# Patient Record
Sex: FEMALE | Race: WHITE | Employment: OTHER | URBAN - METROPOLITAN AREA
[De-identification: names, ages, dates, MRNs, and addresses within clinical notes are randomized per-mention and may not be internally consistent; named-entity substitution may affect disease eponyms.]

---

## 2021-06-12 ENCOUNTER — HOSPITAL ENCOUNTER (INPATIENT)
Age: 71
LOS: 3 days | Discharge: HOME | DRG: 191 | End: 2021-06-15
Attending: EMERGENCY MEDICINE | Admitting: FAMILY MEDICINE
Payer: MEDICARE

## 2021-06-12 ENCOUNTER — APPOINTMENT (OUTPATIENT)
Dept: GENERAL RADIOLOGY | Age: 71
DRG: 191 | End: 2021-06-12
Payer: MEDICARE

## 2021-06-12 DIAGNOSIS — J44.1 COPD EXACERBATION (HCC): Primary | ICD-10-CM

## 2021-06-12 DIAGNOSIS — R06.09 DOE (DYSPNEA ON EXERTION): ICD-10-CM

## 2021-06-12 LAB
ABSOLUTE EOS #: 0.6 K/UL (ref 0–0.4)
ABSOLUTE IMMATURE GRANULOCYTE: ABNORMAL K/UL (ref 0–0.3)
ABSOLUTE LYMPH #: 1.1 K/UL (ref 1–4.8)
ABSOLUTE MONO #: 0.7 K/UL (ref 0.1–1.2)
ANION GAP SERPL CALCULATED.3IONS-SCNC: 11 MMOL/L (ref 9–17)
BASOPHILS # BLD: 1 % (ref 0–2)
BASOPHILS ABSOLUTE: 0.1 K/UL (ref 0–0.2)
BNP INTERPRETATION: ABNORMAL
BUN BLDV-MCNC: 20 MG/DL (ref 8–23)
BUN/CREAT BLD: ABNORMAL (ref 9–20)
CALCIUM SERPL-MCNC: 9.7 MG/DL (ref 8.6–10.4)
CHLORIDE BLD-SCNC: 105 MMOL/L (ref 98–107)
CO2: 23 MMOL/L (ref 20–31)
CREAT SERPL-MCNC: 0.79 MG/DL (ref 0.5–0.9)
D-DIMER QUANTITATIVE: 0.32 MG/L FEU
DIFFERENTIAL TYPE: ABNORMAL
EOSINOPHILS RELATIVE PERCENT: 7 % (ref 1–4)
GFR AFRICAN AMERICAN: >60 ML/MIN
GFR NON-AFRICAN AMERICAN: >60 ML/MIN
GFR SERPL CREATININE-BSD FRML MDRD: ABNORMAL ML/MIN/{1.73_M2}
GFR SERPL CREATININE-BSD FRML MDRD: ABNORMAL ML/MIN/{1.73_M2}
GLUCOSE BLD-MCNC: 143 MG/DL (ref 70–99)
HCT VFR BLD CALC: 40.8 % (ref 36–46)
HEMOGLOBIN: 13.6 G/DL (ref 12–16)
IMMATURE GRANULOCYTES: ABNORMAL %
LYMPHOCYTES # BLD: 13 % (ref 24–44)
MCH RBC QN AUTO: 33 PG (ref 26–34)
MCHC RBC AUTO-ENTMCNC: 33.3 G/DL (ref 31–37)
MCV RBC AUTO: 98.9 FL (ref 80–100)
MONOCYTES # BLD: 9 % (ref 2–11)
NRBC AUTOMATED: ABNORMAL PER 100 WBC
PDW BLD-RTO: 13.8 % (ref 12.5–15.4)
PLATELET # BLD: 374 K/UL (ref 140–450)
PLATELET ESTIMATE: ABNORMAL
PMV BLD AUTO: 7.6 FL (ref 6–12)
POTASSIUM SERPL-SCNC: 3.8 MMOL/L (ref 3.7–5.3)
PRO-BNP: 390 PG/ML
RBC # BLD: 4.13 M/UL (ref 4–5.2)
RBC # BLD: ABNORMAL 10*6/UL
SARS-COV-2, RAPID: NOT DETECTED
SEG NEUTROPHILS: 70 % (ref 36–66)
SEGMENTED NEUTROPHILS ABSOLUTE COUNT: 5.9 K/UL (ref 1.8–7.7)
SODIUM BLD-SCNC: 139 MMOL/L (ref 135–144)
SPECIMEN DESCRIPTION: NORMAL
TROPONIN INTERP: ABNORMAL
TROPONIN INTERP: ABNORMAL
TROPONIN T: ABNORMAL NG/ML
TROPONIN T: ABNORMAL NG/ML
TROPONIN, HIGH SENSITIVITY: 20 NG/L (ref 0–14)
TROPONIN, HIGH SENSITIVITY: 22 NG/L (ref 0–14)
WBC # BLD: 8.4 K/UL (ref 3.5–11)
WBC # BLD: ABNORMAL 10*3/UL

## 2021-06-12 PROCEDURE — 84484 ASSAY OF TROPONIN QUANT: CPT

## 2021-06-12 PROCEDURE — 96374 THER/PROPH/DIAG INJ IV PUSH: CPT

## 2021-06-12 PROCEDURE — 71046 X-RAY EXAM CHEST 2 VIEWS: CPT

## 2021-06-12 PROCEDURE — 80048 BASIC METABOLIC PNL TOTAL CA: CPT

## 2021-06-12 PROCEDURE — 6370000000 HC RX 637 (ALT 250 FOR IP): Performed by: NURSE PRACTITIONER

## 2021-06-12 PROCEDURE — 93005 ELECTROCARDIOGRAM TRACING: CPT | Performed by: PHYSICIAN ASSISTANT

## 2021-06-12 PROCEDURE — 99285 EMERGENCY DEPT VISIT HI MDM: CPT

## 2021-06-12 PROCEDURE — 85025 COMPLETE CBC W/AUTO DIFF WBC: CPT

## 2021-06-12 PROCEDURE — 83874 ASSAY OF MYOGLOBIN: CPT

## 2021-06-12 PROCEDURE — 87635 SARS-COV-2 COVID-19 AMP PRB: CPT

## 2021-06-12 PROCEDURE — 1210000000 HC MED SURG R&B

## 2021-06-12 PROCEDURE — 85379 FIBRIN DEGRADATION QUANT: CPT

## 2021-06-12 PROCEDURE — 36415 COLL VENOUS BLD VENIPUNCTURE: CPT

## 2021-06-12 PROCEDURE — 83880 ASSAY OF NATRIURETIC PEPTIDE: CPT

## 2021-06-12 PROCEDURE — 94640 AIRWAY INHALATION TREATMENT: CPT

## 2021-06-12 PROCEDURE — 6370000000 HC RX 637 (ALT 250 FOR IP): Performed by: PHYSICIAN ASSISTANT

## 2021-06-12 PROCEDURE — 2580000003 HC RX 258: Performed by: NURSE PRACTITIONER

## 2021-06-12 PROCEDURE — 6360000002 HC RX W HCPCS: Performed by: PHYSICIAN ASSISTANT

## 2021-06-12 RX ORDER — BUDESONIDE AND FORMOTEROL FUMARATE DIHYDRATE 160; 4.5 UG/1; UG/1
2 AEROSOL RESPIRATORY (INHALATION) 2 TIMES DAILY
COMMUNITY

## 2021-06-12 RX ORDER — ALBUTEROL SULFATE 2.5 MG/3ML
2.5 SOLUTION RESPIRATORY (INHALATION)
Status: DISCONTINUED | OUTPATIENT
Start: 2021-06-12 | End: 2021-06-15 | Stop reason: HOSPADM

## 2021-06-12 RX ORDER — IPRATROPIUM BROMIDE AND ALBUTEROL SULFATE 2.5; .5 MG/3ML; MG/3ML
1 SOLUTION RESPIRATORY (INHALATION)
Status: DISCONTINUED | OUTPATIENT
Start: 2021-06-13 | End: 2021-06-15 | Stop reason: HOSPADM

## 2021-06-12 RX ORDER — SODIUM CHLORIDE 0.9 % (FLUSH) 0.9 %
5-40 SYRINGE (ML) INJECTION PRN
Status: DISCONTINUED | OUTPATIENT
Start: 2021-06-12 | End: 2021-06-15 | Stop reason: HOSPADM

## 2021-06-12 RX ORDER — ACETAMINOPHEN 325 MG/1
650 TABLET ORAL EVERY 6 HOURS PRN
Status: DISCONTINUED | OUTPATIENT
Start: 2021-06-12 | End: 2021-06-15 | Stop reason: HOSPADM

## 2021-06-12 RX ORDER — METHYLPREDNISOLONE SODIUM SUCCINATE 125 MG/2ML
125 INJECTION, POWDER, LYOPHILIZED, FOR SOLUTION INTRAMUSCULAR; INTRAVENOUS ONCE
Status: COMPLETED | OUTPATIENT
Start: 2021-06-12 | End: 2021-06-12

## 2021-06-12 RX ORDER — ONDANSETRON 2 MG/ML
4 INJECTION INTRAMUSCULAR; INTRAVENOUS EVERY 6 HOURS PRN
Status: DISCONTINUED | OUTPATIENT
Start: 2021-06-12 | End: 2021-06-15 | Stop reason: HOSPADM

## 2021-06-12 RX ORDER — ONDANSETRON 4 MG/1
4 TABLET, ORALLY DISINTEGRATING ORAL EVERY 8 HOURS PRN
Status: DISCONTINUED | OUTPATIENT
Start: 2021-06-12 | End: 2021-06-15 | Stop reason: HOSPADM

## 2021-06-12 RX ORDER — IPRATROPIUM BROMIDE AND ALBUTEROL SULFATE 2.5; .5 MG/3ML; MG/3ML
1 SOLUTION RESPIRATORY (INHALATION)
Status: DISCONTINUED | OUTPATIENT
Start: 2021-06-12 | End: 2021-06-13

## 2021-06-12 RX ORDER — SODIUM CHLORIDE 9 MG/ML
INJECTION, SOLUTION INTRAVENOUS CONTINUOUS
Status: DISCONTINUED | OUTPATIENT
Start: 2021-06-12 | End: 2021-06-14

## 2021-06-12 RX ORDER — METHYLPREDNISOLONE SODIUM SUCCINATE 40 MG/ML
40 INJECTION, POWDER, LYOPHILIZED, FOR SOLUTION INTRAMUSCULAR; INTRAVENOUS EVERY 6 HOURS
Status: DISPENSED | OUTPATIENT
Start: 2021-06-12 | End: 2021-06-14

## 2021-06-12 RX ORDER — POLYETHYLENE GLYCOL 3350 17 G/17G
17 POWDER, FOR SOLUTION ORAL DAILY PRN
Status: DISCONTINUED | OUTPATIENT
Start: 2021-06-12 | End: 2021-06-15 | Stop reason: HOSPADM

## 2021-06-12 RX ORDER — SODIUM CHLORIDE 0.9 % (FLUSH) 0.9 %
5-40 SYRINGE (ML) INJECTION EVERY 12 HOURS SCHEDULED
Status: DISCONTINUED | OUTPATIENT
Start: 2021-06-12 | End: 2021-06-15 | Stop reason: HOSPADM

## 2021-06-12 RX ORDER — SODIUM CHLORIDE 9 MG/ML
25 INJECTION, SOLUTION INTRAVENOUS PRN
Status: DISCONTINUED | OUTPATIENT
Start: 2021-06-12 | End: 2021-06-15 | Stop reason: HOSPADM

## 2021-06-12 RX ORDER — ALBUTEROL SULFATE 90 UG/1
2 AEROSOL, METERED RESPIRATORY (INHALATION) EVERY 4 HOURS PRN
Status: ON HOLD | COMMUNITY
End: 2022-10-09 | Stop reason: SDUPTHER

## 2021-06-12 RX ORDER — LORAZEPAM 0.5 MG/1
0.5 TABLET ORAL ONCE
Status: COMPLETED | OUTPATIENT
Start: 2021-06-12 | End: 2021-06-12

## 2021-06-12 RX ORDER — PREDNISONE 20 MG/1
40 TABLET ORAL DAILY
Status: DISCONTINUED | OUTPATIENT
Start: 2021-06-15 | End: 2021-06-15 | Stop reason: HOSPADM

## 2021-06-12 RX ORDER — ACETAMINOPHEN 650 MG/1
650 SUPPOSITORY RECTAL EVERY 6 HOURS PRN
Status: DISCONTINUED | OUTPATIENT
Start: 2021-06-12 | End: 2021-06-15 | Stop reason: HOSPADM

## 2021-06-12 RX ORDER — BUDESONIDE AND FORMOTEROL FUMARATE DIHYDRATE 160; 4.5 UG/1; UG/1
2 AEROSOL RESPIRATORY (INHALATION) 2 TIMES DAILY
Status: DISCONTINUED | OUTPATIENT
Start: 2021-06-12 | End: 2021-06-15 | Stop reason: HOSPADM

## 2021-06-12 RX ADMIN — IPRATROPIUM BROMIDE AND ALBUTEROL SULFATE 1 AMPULE: .5; 3 SOLUTION RESPIRATORY (INHALATION) at 19:34

## 2021-06-12 RX ADMIN — BUDESONIDE AND FORMOTEROL FUMARATE DIHYDRATE 2 PUFF: 160; 4.5 AEROSOL RESPIRATORY (INHALATION) at 21:46

## 2021-06-12 RX ADMIN — METHYLPREDNISOLONE SODIUM SUCCINATE 125 MG: 125 INJECTION, POWDER, FOR SOLUTION INTRAMUSCULAR; INTRAVENOUS at 18:50

## 2021-06-12 RX ADMIN — SODIUM CHLORIDE: 9 INJECTION, SOLUTION INTRAVENOUS at 21:04

## 2021-06-12 RX ADMIN — LORAZEPAM 0.5 MG: 0.5 TABLET ORAL at 23:00

## 2021-06-12 RX ADMIN — SODIUM CHLORIDE, PRESERVATIVE FREE 10 ML: 5 INJECTION INTRAVENOUS at 21:06

## 2021-06-12 ASSESSMENT — PAIN SCALES - GENERAL
PAINLEVEL_OUTOF10: 1
PAINLEVEL_OUTOF10: 0

## 2021-06-12 NOTE — ED PROVIDER NOTES
[id number] [facility]  [address] [facility] [address]  Phone: [phone]  Fax: [phone] [facility]      Pt Name: Wiley Hawk  MRN: [id number]  [place] 1950  Date of evaluation: 6/12/21      CHIEF COMPLAINT:  Chief Complaint   Patient presents with    Shortness of Breath    Cough       HISTORY OF PRESENT ILLNESS    Wiley Hawk is a 69 y.o. female who presents with respiratory complaint:     Location/Symptom:      Cough? YES    Productive? No  Fever? No  SOB? Yes  Wheezing? Yes  Pleuritic pain? No  Chestpain associated? No  Hx asthma or COPD? Yes  Smoker? Quit, 26 pack year history  Trauma? NO    Timing/Onset: 3 weeks   Context/Setting: Patient here via EMS for significant coughing and shortness of breath has been going on for the last 2 to 3 weeks. Patient states that she has a known history of asthma and COPD, she does have nebulizer treatment and MDI at home. Patient states that she sees Dr. David Hendrix as her pulmonologist in the area. Patient just came up here 14 days ago after living down with family in the [facility] for approximately 17 months. Patient states that she had a significant episode like this March of this year where she was in the hospital for 5 or 6 days. Patient states she had some sort of lung infection at that time. Patient denies any present or preceding chest pain symptoms with today's complaints. Patient denies any present leg swelling or pain nor any after traveling back up here from [place]. Patient denies any thrombotic history. Patient takes no blood thinners. Patient did not receive Covid vaccination due to previously mentioned hospitalization for respiratory issues. Nursing Notes were reviewed. REVIEW OF SYSTEMS       Constitutional:  Per HPI  Eyes: No visual changes. Neck: No neck pain.    Respiratory:  Per HPI  Cardiac:  Per HPI   GI:  Denies abdominal pain/nausea/vomiting/diarrhea. : Denies dysuria. Musculoskeletal: Denies focal weakness. Neurologic: denies headache or focal weakness. Skin:  Denies any rash. Negative in 10 essential Systems except as mentioned above and in the HPI. PAST MEDICAL HISTORY   PMH:  has a past medical history of Asthma, COPD (chronic obstructive pulmonary disease) ([address] and Kidney disease. Surgical History:  has a past surgical history that includes Hysterectomy and Dental surgery. Social History:  reports that she quit smoking about 7 years ago. She has never used smokeless tobacco. She reports that she does not drink alcohol and does not use drugs. Family History: Noncontributory   Psychiatric History: Noncontributory     Allergies:is allergic to penicillins. PHYSICAL EXAM     INITIAL VITALS: BP (!) 173/96   Pulse 111   Temp 98.4 °F (36.9 °C) (Oral)   Resp 20   Ht 5' 5\" (1.651 m)   Wt 46.7 kg (103 lb)   SpO2 99%   BMI 17.14 kg/m²   Constitutional:  Thin build  Eyes:  Pupils equal/round  HENT:  Atraumatic, External ears normal, Nose normal, Post pharynx normal, no tonsillar edema/erythema. Oropharynx moist.   Respiratory:   Expiratory wheezing auscultated all fields. Patient is speaking fluently and vigorously though she has a nasal cannula with about 3 L flowing. Patient is in no respiratory distress. Cardiovascular:    Tachy, RR with normal S1 and S2  Gastrointestinal/Abdomen:  Soft, NT.  BS present. Musculoskeletal:  Normal to inspection  Back:  No CVA tenderness. Integument:  No rash. Neurologic:  Alert, age appropriate interaction/mentation, no focal deficits noted       DIAGNOSTIC RESULTS     EKG: All EKG's are interpreted by the Emergency Department Physician who either signs or Co-signs this chart in the absence of a cardiologist.  Not indicated, or per attending note    RADIOLOGY:   Reviewed the radiologist:  XR CHEST (2 VW)   Final Result   Hyperinflated, clear lungs. LABS:  Labs Reviewed   TROPONIN - Abnormal; Notable for the following components:       Result Value    Troponin, High Sensitivity 22 (*)     All other components within normal limits   CBC WITH AUTO DIFFERENTIAL - Abnormal; Notable for the following components:    Seg Neutrophils 70 (*)     Lymphocytes 13 (*)     Eosinophils % 7 (*)     Absolute Eos # 0.60 (*)     All other components within normal limits   BASIC METABOLIC PANEL - Abnormal; Notable for the following components:    Glucose 143 (*)     All other components within normal limits   BRAIN NATRIURETIC PEPTIDE - Abnormal; Notable for the following components:    Pro- (*)     All other components within normal limits   D-DIMER, QUANTITATIVE   TROPONIN         EMERGENCY DEPARTMENT COURSE/MDM/DDX:     1743  Patient by history and clinically appears to be having a COPD exacerbation sounds as though has been going on for a couple of weeks. She is denying any present or preceding chest pain or palpitations. Complaining a cardiac work-up and adding a D-dimer with her recent travel. She is using a little bit of oxygen approximately 3 L keeping her at around 97-98% though she took this off during my exam and she was maintaining 97%. Patient declined nebulizer here she just received and although I will give her some Solu-Medrol IV.    1937  Will discuss admit with Charla for further nebs/steroids/monitoring with her REED. Prelim CXR looks neg for pneumonia/effusion/other acute process. Pt much better on 02 but was not hypoxic early. Trop slightly elevated so 2nd Trop pending. Pt agreeable to admit. [facility] agreeable to admit.      Orders Placed This Encounter   Medications    methylPREDNISolone sodium (SOLU-MEDROL) injection 125 mg    ipratropium-albuterol (DUONEB) nebulizer solution 1 ampule     Order Specific Question:   Initiate RT Bronchodilator Protocol     Answer:   Yes       CONSULTS:  None      FINAL IMPRESSION      1. COPD exacerbation ([facility]    2. REED (dyspnea on exertion)          DISPOSITION/PLAN:  DISPOSITION          PATIENT REFERRED TO:  No follow-up provider specified.     DISCHARGE MEDICATIONS:  New Prescriptions    No medications on file       (Please note that portions of this note were completed with a voice recognition program.  Efforts were made to edit the dictations but occasionally words are mis-transcribed.)    JOSE Coulter PA-C  06/12/21 1956

## 2021-06-12 NOTE — ED PROVIDER NOTES
[address] [facility] Surg ICU  [address] [facility] [address]  Phone: [phone]      Attending Physician Attestation    I performed a history and physical examination of the patient and discussed management with the mid level provider. I reviewed the mid level provider's note and agree with the documented findings and plan of care. Any areas of disagreement are noted on the chart. I was personally present for the key portions of any procedures. I have documented in the chart those procedures where I was not present during the key portions. I have reviewed the emergency nurses triage note. I agree with the chief complaint, past medical history, past surgical history, allergies, medications, social and family history as documented unless otherwise noted below. Documentation of the HPI, Physical Exam and Medical Decision Making performed by mid level providers is based on my personal performance of the HPI, PE and MDM. For Physician Assistant/ Nurse Practitioner cases/documentation I have personally evaluated this patient and have completed at least one if not all key elements of the E/M (history, physical exam, and MDM). Additional findings are as noted. CHIEF COMPLAINT       Chief Complaint   Patient presents with    Shortness of Breath    Cough         HISTORY OF PRESENT ILLNESS    Shana Ray is a 69 y.o. female who presents with cough and dyspnea. History of COPD. Congestion. EMS brought patient to ED had aerosal.        PAST MEDICAL HISTORY    has a past medical history of Asthma, COPD (chronic obstructive pulmonary disease) ([address] Fibromyalgia, Herniated disc, cervical, Herniated lumbar intervertebral disc, and Kidney disease. SURGICAL HISTORY      has a past surgical history that includes Hysterectomy and Dental surgery.     CURRENT MEDICATIONS       Current Discharge Medication List      CONTINUE these medications which have NOT CHANGED    Details budesonide-formoterol (SYMBICORT) 160-4.5 MCG/ACT AERO Inhale 2 puffs into the lungs 2 times daily      albuterol sulfate HFA (VENTOLIN HFA) 108 (90 Base) MCG/ACT inhaler Inhale 2 puffs into the lungs every 4 hours as needed for Wheezing       albuterol (PROVENTIL) (5 MG/ML) 0.5% nebulizer solution Take 2.5 mg by nebulization every 4 hours as needed for Wheezing             ALLERGIES     is allergic to penicillins. FAMILY HISTORY     She indicated that the status of her mother is unknown. She indicated that the status of her father is unknown.     family history includes Diabetes in her mother; Heart Disease in her father. SOCIAL HISTORY      reports that she quit smoking about 7 years ago. She has never used smokeless tobacco. She reports current alcohol use of about 3.0 standard drinks of alcohol per week. She reports that she does not use drugs. PHYSICAL EXAM     INITIAL VITALS:  height is 5' 5\" (1.651 m) and weight is 49 kg (108 lb 0.4 oz). Her oral temperature is 97.7 °F (36.5 °C). Her blood pressure is 126/68 and her pulse is 84. Her respiration is 20 and oxygen saturation is 96%.       The head is normocephalic   The neck is supple trachea midline no adenopathy no meningeal signs  Cardiac S1-S2 with a regular rate and rhythm  Pulmonary there is scattered wheezing throughout both lung fields with some mild respiratory distress  Abdomen is soft nontender nondistended with positive bowel sounds  Extremities are warm and dry with good pulses motor sensation throughout no calf swelling or tenderness appreciated  Skin is without rashes or lesions  Neurologic GCS is 15 and no focal deficits are appreciated      DIAGNOSTIC RESULTS     EKG: All EKG's are interpreted by the Emergency Department Physician who either signs or Co-signs this chart in the absence of a cardiologist.      Interpreted by Aleshia Garcia MD     Rhythm: Sinus tachycardia  Rate: 109  Axis: 21  Ectopy: none  Conduction: Poor R wave progression  ST Segments: no acute change  T Waves: no acute change  Q Waves: none    Clinical Impression: Sinus tachycardia with no acute changes/normal EKG. No acute infarction/ischemia noted. RADIOLOGY:   Non-plain film images such as CT, Ultrasound and MRI are read by the radiologist. Roanne Barthel radiographic images are visualized and the radiologist interpretations are reviewed as follows:     XR CHEST (2 VW) (Final result)  Result time 06/12/21 19:36:30  Final result by Fadi Otero MD (06/12/21 19:36:30)                Impression:    Hyperinflated, clear lungs. Narrative:    EXAMINATION:   TWO XRAY VIEWS OF THE CHEST     6/12/2021 5:39 pm     COMPARISON:   None. HISTORY:   ORDERING SYSTEM PROVIDED HISTORY: SOB/cough; COPD history   TECHNOLOGIST PROVIDED HISTORY:   SOB/cough; COPD history   Reason for Exam: Shortness of breath, cough   Acuity: Acute   Type of Exam: Initial     FINDINGS:   Upright frontal and lateral view chest radiographs were obtained. The heart size, mediastinal contour, and pleural spaces are within normal   limits. The lungs are hyperinflated but clear. There is no focal   consolidation or pneumothorax.  The pulmonary vascular pattern is within   normal limits.  No significant thoracic osseous abnormality. LABS:  Results for orders placed or performed during the hospital encounter of 06/12/21   COVID-19, Rapid    Specimen: Nasopharyngeal Swab   Result Value Ref Range    Specimen Description . NASOPHARYNGEAL SWAB     SARS-CoV-2, Rapid Not Detected Not Detected   Troponin   Result Value Ref Range    Troponin, High Sensitivity 22 (H) 0 - 14 ng/L    Troponin T NOT REPORTED <0.03 ng/mL    Troponin Interp NOT REPORTED    CBC Auto Differential   Result Value Ref Range    WBC 8.4 3.5 - 11.0 k/uL    RBC 4.13 4.0 - 5.2 m/uL    Hemoglobin 13.6 12.0 - 16.0 g/dL    Hematocrit 40.8 36 - 46 %    MCV 98.9 80 - 100 fL    MCH 33.0 26 - 34 pg    MCHC 33.3 31 - 37 g/dL RDW 13.8 12.5 - 15.4 %    Platelets 534 515 - 061 k/uL    MPV 7.6 6.0 - 12.0 fL    NRBC Automated NOT REPORTED per 100 WBC    Differential Type NOT REPORTED     Seg Neutrophils 70 (H) 36 - 66 %    Lymphocytes 13 (L) 24 - 44 %    Monocytes 9 2 - 11 %    Eosinophils % 7 (H) 1 - 4 %    Basophils 1 0 - 2 %    Immature Granulocytes NOT REPORTED 0 %    Segs Absolute 5.90 1.8 - 7.7 k/uL    Absolute Lymph # 1.10 1.0 - 4.8 k/uL    Absolute Mono # 0.70 0.1 - 1.2 k/uL    Absolute Eos # 0.60 (H) 0.0 - 0.4 k/uL    Basophils Absolute 0.10 0.0 - 0.2 k/uL    Absolute Immature Granulocyte NOT REPORTED 0.00 - 0.30 k/uL    WBC Morphology NOT REPORTED     RBC Morphology NOT REPORTED     Platelet Estimate NOT REPORTED    Basic Metabolic Panel   Result Value Ref Range    Glucose 143 (H) 70 - 99 mg/dL    BUN 20 8 - 23 mg/dL    CREATININE 0.79 0.50 - 0.90 mg/dL    Bun/Cre Ratio NOT REPORTED 9 - 20    Calcium 9.7 8.6 - 10.4 mg/dL    Sodium 139 135 - 144 mmol/L    Potassium 3.8 3.7 - 5.3 mmol/L    Chloride 105 98 - 107 mmol/L    CO2 23 20 - 31 mmol/L    Anion Gap 11 9 - 17 mmol/L    GFR Non-African American >60 >60 mL/min    GFR African American >60 >60 mL/min    GFR Comment          GFR Staging NOT REPORTED    D-Dimer, Quantitative   Result Value Ref Range    D-Dimer, Quant 0.32 mg/L FEU   Brain Natriuretic Peptide   Result Value Ref Range    Pro- (H) <300 pg/mL    BNP Interpretation Pro-BNP Reference Range:    Troponin   Result Value Ref Range    Troponin, High Sensitivity 20 (H) 0 - 14 ng/L    Troponin T NOT REPORTED <0.03 ng/mL    Troponin Interp NOT REPORTED    Basic Metabolic Panel w/ Reflex to MG   Result Value Ref Range    Glucose 191 (H) 70 - 99 mg/dL    BUN 25 (H) 8 - 23 mg/dL    CREATININE 0.80 0.50 - 0.90 mg/dL    Bun/Cre Ratio NOT REPORTED 9 - 20    Calcium 9.0 8.6 - 10.4 mg/dL    Sodium 137 135 - 144 mmol/L    Potassium 4.5 3.7 - 5.3 mmol/L    Chloride 107 98 - 107 mmol/L    CO2 23 20 - 31 mmol/L    Anion Gap 7 (L) 9

## 2021-06-13 ENCOUNTER — APPOINTMENT (OUTPATIENT)
Dept: GENERAL RADIOLOGY | Age: 71
DRG: 191 | End: 2021-06-13
Payer: MEDICARE

## 2021-06-13 PROBLEM — N18.30 STAGE 3 CHRONIC KIDNEY DISEASE (HCC): Status: ACTIVE | Noted: 2021-06-13

## 2021-06-13 PROBLEM — I10 ESSENTIAL HYPERTENSION: Status: ACTIVE | Noted: 2021-06-13

## 2021-06-13 PROBLEM — R06.02 SHORTNESS OF BREATH: Status: ACTIVE | Noted: 2021-06-13

## 2021-06-13 PROBLEM — J44.9 CHRONIC OBSTRUCTIVE PULMONARY DISEASE (HCC): Status: ACTIVE | Noted: 2021-06-12

## 2021-06-13 PROBLEM — R06.09 DOE (DYSPNEA ON EXERTION): Status: ACTIVE | Noted: 2021-06-13

## 2021-06-13 LAB
ANION GAP SERPL CALCULATED.3IONS-SCNC: 7 MMOL/L (ref 9–17)
BUN BLDV-MCNC: 25 MG/DL (ref 8–23)
BUN/CREAT BLD: ABNORMAL (ref 9–20)
CALCIUM SERPL-MCNC: 9 MG/DL (ref 8.6–10.4)
CHLORIDE BLD-SCNC: 107 MMOL/L (ref 98–107)
CO2: 23 MMOL/L (ref 20–31)
CREAT SERPL-MCNC: 0.8 MG/DL (ref 0.5–0.9)
GFR AFRICAN AMERICAN: >60 ML/MIN
GFR NON-AFRICAN AMERICAN: >60 ML/MIN
GFR SERPL CREATININE-BSD FRML MDRD: ABNORMAL ML/MIN/{1.73_M2}
GFR SERPL CREATININE-BSD FRML MDRD: ABNORMAL ML/MIN/{1.73_M2}
GLUCOSE BLD-MCNC: 144 MG/DL (ref 65–105)
GLUCOSE BLD-MCNC: 145 MG/DL (ref 65–105)
GLUCOSE BLD-MCNC: 191 MG/DL (ref 70–99)
HCT VFR BLD CALC: 38.5 % (ref 36–46)
HEMOGLOBIN: 12.7 G/DL (ref 12–16)
MCH RBC QN AUTO: 32.8 PG (ref 26–34)
MCHC RBC AUTO-ENTMCNC: 33.1 G/DL (ref 31–37)
MCV RBC AUTO: 99.2 FL (ref 80–100)
MYOGLOBIN: 59 NG/ML (ref 25–58)
MYOGLOBIN: 62 NG/ML (ref 25–58)
NRBC AUTOMATED: ABNORMAL PER 100 WBC
PDW BLD-RTO: 14 % (ref 12.5–15.4)
PLATELET # BLD: 326 K/UL (ref 140–450)
PMV BLD AUTO: 7.4 FL (ref 6–12)
POTASSIUM SERPL-SCNC: 4.5 MMOL/L (ref 3.7–5.3)
RBC # BLD: 3.88 M/UL (ref 4–5.2)
SODIUM BLD-SCNC: 137 MMOL/L (ref 135–144)
TROPONIN INTERP: ABNORMAL
TROPONIN INTERP: ABNORMAL
TROPONIN T: ABNORMAL NG/ML
TROPONIN T: ABNORMAL NG/ML
TROPONIN, HIGH SENSITIVITY: 13 NG/L (ref 0–14)
TROPONIN, HIGH SENSITIVITY: 14 NG/L (ref 0–14)
WBC # BLD: 6.5 K/UL (ref 3.5–11)

## 2021-06-13 PROCEDURE — 6370000000 HC RX 637 (ALT 250 FOR IP): Performed by: PHYSICIAN ASSISTANT

## 2021-06-13 PROCEDURE — 82947 ASSAY GLUCOSE BLOOD QUANT: CPT

## 2021-06-13 PROCEDURE — 6370000000 HC RX 637 (ALT 250 FOR IP): Performed by: NURSE PRACTITIONER

## 2021-06-13 PROCEDURE — 2700000000 HC OXYGEN THERAPY PER DAY

## 2021-06-13 PROCEDURE — 85027 COMPLETE CBC AUTOMATED: CPT

## 2021-06-13 PROCEDURE — 99223 1ST HOSP IP/OBS HIGH 75: CPT | Performed by: FAMILY MEDICINE

## 2021-06-13 PROCEDURE — 94640 AIRWAY INHALATION TREATMENT: CPT

## 2021-06-13 PROCEDURE — 6370000000 HC RX 637 (ALT 250 FOR IP): Performed by: FAMILY MEDICINE

## 2021-06-13 PROCEDURE — 83874 ASSAY OF MYOGLOBIN: CPT

## 2021-06-13 PROCEDURE — 6360000002 HC RX W HCPCS: Performed by: NURSE PRACTITIONER

## 2021-06-13 PROCEDURE — 80048 BASIC METABOLIC PNL TOTAL CA: CPT

## 2021-06-13 PROCEDURE — 1210000000 HC MED SURG R&B

## 2021-06-13 PROCEDURE — APPSS45 APP SPLIT SHARED TIME 31-45 MINUTES: Performed by: NURSE PRACTITIONER

## 2021-06-13 PROCEDURE — 84484 ASSAY OF TROPONIN QUANT: CPT

## 2021-06-13 PROCEDURE — 71045 X-RAY EXAM CHEST 1 VIEW: CPT

## 2021-06-13 PROCEDURE — 2580000003 HC RX 258: Performed by: NURSE PRACTITIONER

## 2021-06-13 PROCEDURE — 36415 COLL VENOUS BLD VENIPUNCTURE: CPT

## 2021-06-13 RX ORDER — DEXTROSE MONOHYDRATE 50 MG/ML
100 INJECTION, SOLUTION INTRAVENOUS PRN
Status: DISCONTINUED | OUTPATIENT
Start: 2021-06-13 | End: 2021-06-15 | Stop reason: HOSPADM

## 2021-06-13 RX ORDER — CODEINE PHOSPHATE AND GUAIFENESIN 10; 100 MG/5ML; MG/5ML
10 SOLUTION ORAL EVERY 4 HOURS PRN
Status: DISCONTINUED | OUTPATIENT
Start: 2021-06-13 | End: 2021-06-13

## 2021-06-13 RX ORDER — LEVOFLOXACIN 500 MG/1
500 TABLET, FILM COATED ORAL DAILY
Status: DISCONTINUED | OUTPATIENT
Start: 2021-06-13 | End: 2021-06-15 | Stop reason: HOSPADM

## 2021-06-13 RX ORDER — FAMOTIDINE 20 MG/1
20 TABLET, FILM COATED ORAL 2 TIMES DAILY
Status: DISCONTINUED | OUTPATIENT
Start: 2021-06-13 | End: 2021-06-15 | Stop reason: HOSPADM

## 2021-06-13 RX ORDER — NICOTINE POLACRILEX 4 MG
15 LOZENGE BUCCAL PRN
Status: DISCONTINUED | OUTPATIENT
Start: 2021-06-13 | End: 2021-06-15 | Stop reason: HOSPADM

## 2021-06-13 RX ORDER — MONTELUKAST SODIUM 10 MG/1
10 TABLET ORAL NIGHTLY
Status: DISCONTINUED | OUTPATIENT
Start: 2021-06-13 | End: 2021-06-15 | Stop reason: HOSPADM

## 2021-06-13 RX ORDER — HYDROCODONE POLISTIREX AND CHLORPHENIRAMINE POLISTIREX 10; 8 MG/5ML; MG/5ML
5 SUSPENSION, EXTENDED RELEASE ORAL EVERY 12 HOURS PRN
Status: DISCONTINUED | OUTPATIENT
Start: 2021-06-13 | End: 2021-06-15 | Stop reason: HOSPADM

## 2021-06-13 RX ORDER — DEXTROSE MONOHYDRATE 25 G/50ML
12.5 INJECTION, SOLUTION INTRAVENOUS PRN
Status: DISCONTINUED | OUTPATIENT
Start: 2021-06-13 | End: 2021-06-15 | Stop reason: HOSPADM

## 2021-06-13 RX ADMIN — IPRATROPIUM BROMIDE AND ALBUTEROL SULFATE 1 AMPULE: .5; 2.5 SOLUTION RESPIRATORY (INHALATION) at 13:26

## 2021-06-13 RX ADMIN — BUDESONIDE AND FORMOTEROL FUMARATE DIHYDRATE 2 PUFF: 160; 4.5 AEROSOL RESPIRATORY (INHALATION) at 07:47

## 2021-06-13 RX ADMIN — FAMOTIDINE 20 MG: 20 TABLET ORAL at 20:56

## 2021-06-13 RX ADMIN — METHYLPREDNISOLONE SODIUM SUCCINATE 40 MG: 40 INJECTION, POWDER, FOR SOLUTION INTRAMUSCULAR; INTRAVENOUS at 09:42

## 2021-06-13 RX ADMIN — LEVOFLOXACIN 500 MG: 500 TABLET, FILM COATED ORAL at 09:44

## 2021-06-13 RX ADMIN — METHYLPREDNISOLONE SODIUM SUCCINATE 40 MG: 40 INJECTION, POWDER, FOR SOLUTION INTRAMUSCULAR; INTRAVENOUS at 03:43

## 2021-06-13 RX ADMIN — IPRATROPIUM BROMIDE AND ALBUTEROL SULFATE 1 AMPULE: .5; 2.5 SOLUTION RESPIRATORY (INHALATION) at 20:14

## 2021-06-13 RX ADMIN — METHYLPREDNISOLONE SODIUM SUCCINATE 40 MG: 40 INJECTION, POWDER, FOR SOLUTION INTRAMUSCULAR; INTRAVENOUS at 20:55

## 2021-06-13 RX ADMIN — IPRATROPIUM BROMIDE AND ALBUTEROL SULFATE 1 AMPULE: .5; 3 SOLUTION RESPIRATORY (INHALATION) at 16:49

## 2021-06-13 RX ADMIN — SODIUM CHLORIDE, PRESERVATIVE FREE 10 ML: 5 INJECTION INTRAVENOUS at 20:55

## 2021-06-13 RX ADMIN — INSULIN LISPRO 1 UNITS: 100 INJECTION, SOLUTION INTRAVENOUS; SUBCUTANEOUS at 17:18

## 2021-06-13 RX ADMIN — IPRATROPIUM BROMIDE AND ALBUTEROL SULFATE 1 AMPULE: .5; 3 SOLUTION RESPIRATORY (INHALATION) at 07:46

## 2021-06-13 RX ADMIN — SODIUM CHLORIDE: 9 INJECTION, SOLUTION INTRAVENOUS at 23:17

## 2021-06-13 RX ADMIN — MONTELUKAST SODIUM 10 MG: 10 TABLET, FILM COATED ORAL at 20:56

## 2021-06-13 RX ADMIN — METHYLPREDNISOLONE SODIUM SUCCINATE 40 MG: 40 INJECTION, POWDER, FOR SOLUTION INTRAMUSCULAR; INTRAVENOUS at 15:30

## 2021-06-13 RX ADMIN — BENZOCAINE AND MENTHOL 1 LOZENGE: 15; 3.6 LOZENGE ORAL at 20:55

## 2021-06-13 RX ADMIN — ENOXAPARIN SODIUM 40 MG: 40 INJECTION SUBCUTANEOUS at 09:42

## 2021-06-13 RX ADMIN — INSULIN LISPRO 1 UNITS: 100 INJECTION, SOLUTION INTRAVENOUS; SUBCUTANEOUS at 20:55

## 2021-06-13 RX ADMIN — BUDESONIDE AND FORMOTEROL FUMARATE DIHYDRATE 2 PUFF: 160; 4.5 AEROSOL RESPIRATORY (INHALATION) at 20:14

## 2021-06-13 RX ADMIN — Medication 5 ML: at 15:30

## 2021-06-13 ASSESSMENT — PAIN SCALES - GENERAL
PAINLEVEL_OUTOF10: 0

## 2021-06-13 ASSESSMENT — ENCOUNTER SYMPTOMS
CHOKING: 0
COUGH: 1
CHEST TIGHTNESS: 0
SHORTNESS OF BREATH: 1
WHEEZING: 1
EYES NEGATIVE: 1
GASTROINTESTINAL NEGATIVE: 1

## 2021-06-13 NOTE — PROGRESS NOTES
RT in for Aerosol administration . Pt awake Alert , watching Tv . 02 continues at 2lpm  , spot check reflecting a sat of 96% . Breath sounds with bilateral wheezing .  Harsh congested cough persist .

## 2021-06-13 NOTE — PLAN OF CARE
RT in to administer Aerosol therapy as well as MDI . Upon arrival to room pt was experiencing coughing spell . 02 noted at 2lpm producing a sat of 96% . Breath sounds reveal expiratory wheezes . Water provided to rinse mouth.

## 2021-06-13 NOTE — PLAN OF CARE
Problem: Falls - Risk of:  Goal: Will remain free from falls  Description: Will remain free from falls  6/13/2021 1504 by Hanna Guadalupe RN  Outcome: Ongoing  6/13/2021 0459 by Raheem Lim RN  Outcome: Ongoing  Goal: Absence of physical injury  Description: Absence of physical injury  6/13/2021 1504 by Hanna Guadalupe RN  Outcome: Ongoing  6/13/2021 0459 by Raheem Lim RN  Outcome: Ongoing     Problem: Breathing Pattern - Ineffective:  Goal: Ability to achieve and maintain a regular respiratory rate will improve  Description: Ability to achieve and maintain a regular respiratory rate will improve  6/13/2021 1504 by Hanna Guadalupe RN  Outcome: Ongoing  6/13/2021 0459 by Raheem Lim RN  Outcome: Ongoing     Problem: Respiratory:  Goal: Ability to maintain a clear airway will improve  Description: Ability to maintain a clear airway will improve  6/13/2021 1504 by Hanna Guadalupe RN  Outcome: Ongoing  6/13/2021 0819 by Aditi French RCP  Outcome: Ongoing  Goal: Ability to maintain adequate ventilation will improve  Description: Ability to maintain adequate ventilation will improve  6/13/2021 1504 by Hanna Guadalupe RN  Outcome: Ongoing  6/13/2021 0819 by Aditi French RCP  Outcome: Ongoing

## 2021-06-13 NOTE — H&P
[facility]  Office: [address], DO, Bernarda Banegas, DO, Katie Harkins, DO, Marlo Kanner Holland, DO, Matt Nowak MD, Lizzeth Putnam MD, Darby Olson MD, Martina Ulloa MD, Mariluz Lopez MD, Rochelle Castellanos MD, Vanesa Banks MD, Randall Miranda MD, Cheryl Mcneal DO, Michael Mtz MD, Yessenia Agrawal DO, Gustavo Bangura MD,  Shelia Garcia DO, Kay Lechuga MD, Nate Schmidt MD, Dayanara Mahoney MD, Tamra Cain MD, Boyd Friend CNP, [facility], CNP, Westley Hilliard, CNP, Olya Tena, CNS, Yolanda Fields, CNP, Kym Evans, CNP, Jessi Rosales, CNP, Sebastien Carlos, CNP, Hardy Badillo, CNP, Vero Bradley PA-C, Babatunde Jama, [facility], Manolo Xie, CNP, Toy Nieto, CNP, Veronika Snow, CNP, Cindy Mittal, CNP, Kiarra Holt, CNP, Pedro Dias, CNP         [facility]   [address] [facility]    HISTORY AND PHYSICAL EXAMINATION            Date:   6/13/2021  Patient name:  Paola Avila  Date of admission:  6/12/2021  5:05 PM  MRN:   [id number]  Account:  [de-identified]  YOB: 1950  PCP:    No primary care provider on file. Room:   [address] [place] [address]  Code Status:    Full Code    Chief Complaint:     Chief Complaint   Patient presents with    Shortness of Breath    Cough       History Obtained From:     patient    History of Present Illness:     Paola Avila is a 69 y.o. Non-/non  female who presents with Shortness of Breath and Cough   and is admitted to the hospital for the management of Chronic obstructive pulmonary disease with acute exacerbation ([facility] [address] Patient reports for the last 2 days she has been coughing and has not been able to catch her breath. She says she been using her nebulizer every few hours and was not really doing anything to help her. .  She called EMS after she was not able to stand well without being very short of breath. She says it she felt her heart racing with coughing and palpitations. Relation Age of Onset    Diabetes Mother     Heart Disease Father        Review of Systems:     Positive and Negative as described in HPI. Review of Systems   Constitutional: Negative. HENT: Negative. Eyes: Negative. Respiratory: Positive for cough, shortness of breath and wheezing. Negative for choking and chest tightness. Cardiovascular: Positive for palpitations. Negative for chest pain and leg swelling. Gastrointestinal: Negative. Genitourinary: Negative. Musculoskeletal: Negative. Skin: Negative. Neurological: Negative. Hematological: Negative. Psychiatric/Behavioral: Negative. Physical Exam:   BP (!) 151/73   Pulse 98   Temp 98.4 °F (36.9 °C) (Oral)   Resp 20   Ht 5' 5\" (1.651 m)   Wt 108 lb 0.4 oz (49 kg)   LMP  (LMP Unknown)   SpO2 95%   Breastfeeding No   BMI 17.98 kg/m²   Temp (24hrs), Av.1 °F (36.7 °C), Min:97.7 °F (36.5 °C), Max:98.4 °F (36.9 °C)        Intake/Output Summary (Last 24 hours) at 2021 1536  Last data filed at 2021 0715  Gross per 24 hour   Intake 240 ml   Output 750 ml   Net -510 ml       Physical Exam  Vitals and nursing note reviewed. Constitutional:       General: She is in acute distress. Appearance: She is ill-appearing. She is not toxic-appearing or diaphoretic. HENT:      Head: Normocephalic and atraumatic. Right Ear: External ear normal.      Left Ear: External ear normal.      Nose: Nose normal. No rhinorrhea. Mouth/Throat:      Mouth: Mucous membranes are moist.   Eyes:      General: No scleral icterus. Right eye: No discharge. Left eye: No discharge. Extraocular Movements: Extraocular movements intact. Conjunctiva/sclera: Conjunctivae normal.      Pupils: Pupils are equal, round, and reactive to light. Neck:      Comments: No JVD  Cardiovascular:      Rate and Rhythm: Normal rate and regular rhythm. Pulses: Normal pulses. Heart sounds: Normal heart sounds.  No murmur heard. No friction rub. No gallop. Pulmonary:      Effort: Respiratory distress present. Breath sounds: No stridor. Wheezing present. No rhonchi or rales. Chest:      Chest wall: No tenderness. Abdominal:      General: There is no distension. Palpations: Abdomen is soft. Tenderness: There is no abdominal tenderness. There is no guarding. Hernia: No hernia is present. Comments: Bowel sounds hypoactive   Musculoskeletal:         General: Normal range of motion. Cervical back: Normal range of motion and neck supple. Right lower leg: No edema. Left lower leg: No edema. Skin:     General: Skin is warm and dry. Coloration: Skin is not jaundiced. Findings: No bruising, erythema or lesion. Neurological:      General: No focal deficit present. Mental Status: She is alert and oriented to person, place, and time. Psychiatric:         Attention and Perception: Attention and perception normal.         Mood and Affect: Affect normal. Mood is anxious. Speech: Speech normal.         Behavior: Behavior normal. Behavior is cooperative. Thought Content:  Thought content normal.         Cognition and Memory: Cognition and memory normal.         Judgment: Judgment normal.         Investigations:      Laboratory Testing:  Recent Results (from the past 24 hour(s))   Troponin    Collection Time: 06/12/21  5:52 PM   Result Value Ref Range    Troponin, High Sensitivity 22 (H) 0 - 14 ng/L    Troponin T NOT REPORTED <0.03 ng/mL    Troponin Interp NOT REPORTED    CBC Auto Differential    Collection Time: 06/12/21  5:52 PM   Result Value Ref Range    WBC 8.4 3.5 - 11.0 k/uL    RBC 4.13 4.0 - 5.2 m/uL    Hemoglobin 13.6 12.0 - 16.0 g/dL    Hematocrit 40.8 36 - 46 %    MCV 98.9 80 - 100 fL    MCH 33.0 26 - 34 pg    MCHC 33.3 31 - 37 g/dL    RDW 13.8 12.5 - 15.4 %    Platelets 392 417 - 470 k/uL    MPV 7.6 6.0 - 12.0 fL    NRBC Automated NOT REPORTED per 100 acute exacerbation ([facility] 6/13/2021 Yes    Essential hypertension 6/13/2021 Yes    Shortness of breath 6/13/2021 Yes    Stage 3 chronic kidney disease ([facility] 6/13/2021 Yes          Plan:     Patient status inpatient in the Med/Surge    1. COPD/asthma exacerbation: Supplemental O2 as needed to keep SPO2 greater than 92%. IV Solu-Medrol 40 mg every 6 hours. Levaquin daily p.o. Continue Symbicort and Singulair. Nebulizer treatments as needed for wheezing or shortness of breath as ordered. Monitor blood glucose while on steroid. GI prophylaxis  2. Hypertension: Monitor vital signs every 8 hours. Vital signs reviewed. 3. Shortness of breath: See #1  4. Chronic kidney disease: Check BMP in AM.  Avoid nephrotoxic agents. Replace electrolytes as needed. Consultations:   [facility]     Patient is admitted as inpatient status because of co-morbidities listed above, severity of signs and symptoms as outlined, requirement for current medical therapies and most importantly because of direct risk to patient if care not provided in a hospital setting. Expected length of stay > 48 hours. DIONE Gaitan NP  6/13/2021  3:36 PM    Copy sent to Dr. Mayra Fontana primary care provider on file.

## 2021-06-14 LAB
ANION GAP SERPL CALCULATED.3IONS-SCNC: 10 MMOL/L (ref 9–17)
BUN BLDV-MCNC: 22 MG/DL (ref 8–23)
BUN/CREAT BLD: ABNORMAL (ref 9–20)
CALCIUM SERPL-MCNC: 9.2 MG/DL (ref 8.6–10.4)
CHLORIDE BLD-SCNC: 108 MMOL/L (ref 98–107)
CO2: 21 MMOL/L (ref 20–31)
CREAT SERPL-MCNC: 0.68 MG/DL (ref 0.5–0.9)
EKG ATRIAL RATE: 100 BPM
EKG P AXIS: 84 DEGREES
EKG P-R INTERVAL: 156 MS
EKG Q-T INTERVAL: 358 MS
EKG QRS DURATION: 74 MS
EKG QTC CALCULATION (BAZETT): 461 MS
EKG R AXIS: 31 DEGREES
EKG T AXIS: 73 DEGREES
EKG VENTRICULAR RATE: 100 BPM
GFR AFRICAN AMERICAN: >60 ML/MIN
GFR NON-AFRICAN AMERICAN: >60 ML/MIN
GFR SERPL CREATININE-BSD FRML MDRD: ABNORMAL ML/MIN/{1.73_M2}
GFR SERPL CREATININE-BSD FRML MDRD: ABNORMAL ML/MIN/{1.73_M2}
GLUCOSE BLD-MCNC: 125 MG/DL (ref 65–105)
GLUCOSE BLD-MCNC: 128 MG/DL (ref 65–105)
GLUCOSE BLD-MCNC: 128 MG/DL (ref 65–105)
GLUCOSE BLD-MCNC: 188 MG/DL (ref 70–99)
GLUCOSE BLD-MCNC: 194 MG/DL (ref 65–105)
POTASSIUM SERPL-SCNC: 4 MMOL/L (ref 3.7–5.3)
SODIUM BLD-SCNC: 139 MMOL/L (ref 135–144)

## 2021-06-14 PROCEDURE — 6370000000 HC RX 637 (ALT 250 FOR IP): Performed by: NURSE PRACTITIONER

## 2021-06-14 PROCEDURE — 97535 SELF CARE MNGMENT TRAINING: CPT

## 2021-06-14 PROCEDURE — 97116 GAIT TRAINING THERAPY: CPT

## 2021-06-14 PROCEDURE — 36415 COLL VENOUS BLD VENIPUNCTURE: CPT

## 2021-06-14 PROCEDURE — 6370000000 HC RX 637 (ALT 250 FOR IP): Performed by: FAMILY MEDICINE

## 2021-06-14 PROCEDURE — 6360000002 HC RX W HCPCS: Performed by: NURSE PRACTITIONER

## 2021-06-14 PROCEDURE — 1210000000 HC MED SURG R&B

## 2021-06-14 PROCEDURE — 80048 BASIC METABOLIC PNL TOTAL CA: CPT

## 2021-06-14 PROCEDURE — 99232 SBSQ HOSP IP/OBS MODERATE 35: CPT | Performed by: FAMILY MEDICINE

## 2021-06-14 PROCEDURE — 94640 AIRWAY INHALATION TREATMENT: CPT

## 2021-06-14 PROCEDURE — 97162 PT EVAL MOD COMPLEX 30 MIN: CPT

## 2021-06-14 PROCEDURE — 2580000003 HC RX 258: Performed by: NURSE PRACTITIONER

## 2021-06-14 PROCEDURE — 97166 OT EVAL MOD COMPLEX 45 MIN: CPT

## 2021-06-14 PROCEDURE — 82947 ASSAY GLUCOSE BLOOD QUANT: CPT

## 2021-06-14 RX ADMIN — BUDESONIDE AND FORMOTEROL FUMARATE DIHYDRATE 2 PUFF: 160; 4.5 AEROSOL RESPIRATORY (INHALATION) at 20:28

## 2021-06-14 RX ADMIN — FAMOTIDINE 20 MG: 20 TABLET ORAL at 09:18

## 2021-06-14 RX ADMIN — IPRATROPIUM BROMIDE AND ALBUTEROL SULFATE 1 AMPULE: .5; 2.5 SOLUTION RESPIRATORY (INHALATION) at 15:40

## 2021-06-14 RX ADMIN — IPRATROPIUM BROMIDE AND ALBUTEROL SULFATE 1 AMPULE: .5; 2.5 SOLUTION RESPIRATORY (INHALATION) at 07:41

## 2021-06-14 RX ADMIN — INSULIN LISPRO 1 UNITS: 100 INJECTION, SOLUTION INTRAVENOUS; SUBCUTANEOUS at 21:10

## 2021-06-14 RX ADMIN — BUDESONIDE AND FORMOTEROL FUMARATE DIHYDRATE 2 PUFF: 160; 4.5 AEROSOL RESPIRATORY (INHALATION) at 07:50

## 2021-06-14 RX ADMIN — IPRATROPIUM BROMIDE AND ALBUTEROL SULFATE 1 AMPULE: .5; 2.5 SOLUTION RESPIRATORY (INHALATION) at 20:11

## 2021-06-14 RX ADMIN — METHYLPREDNISOLONE SODIUM SUCCINATE 40 MG: 40 INJECTION, POWDER, FOR SOLUTION INTRAMUSCULAR; INTRAVENOUS at 03:19

## 2021-06-14 RX ADMIN — MONTELUKAST SODIUM 10 MG: 10 TABLET, FILM COATED ORAL at 21:10

## 2021-06-14 RX ADMIN — IPRATROPIUM BROMIDE AND ALBUTEROL SULFATE 1 AMPULE: .5; 2.5 SOLUTION RESPIRATORY (INHALATION) at 11:35

## 2021-06-14 RX ADMIN — ENOXAPARIN SODIUM 40 MG: 40 INJECTION SUBCUTANEOUS at 09:18

## 2021-06-14 RX ADMIN — METHYLPREDNISOLONE SODIUM SUCCINATE 40 MG: 40 INJECTION, POWDER, FOR SOLUTION INTRAMUSCULAR; INTRAVENOUS at 09:14

## 2021-06-14 RX ADMIN — LEVOFLOXACIN 500 MG: 500 TABLET, FILM COATED ORAL at 09:17

## 2021-06-14 RX ADMIN — SODIUM CHLORIDE, PRESERVATIVE FREE 10 ML: 5 INJECTION INTRAVENOUS at 16:37

## 2021-06-14 RX ADMIN — METHYLPREDNISOLONE SODIUM SUCCINATE 40 MG: 40 INJECTION, POWDER, FOR SOLUTION INTRAMUSCULAR; INTRAVENOUS at 16:37

## 2021-06-14 RX ADMIN — FAMOTIDINE 20 MG: 20 TABLET ORAL at 21:10

## 2021-06-14 RX ADMIN — SODIUM CHLORIDE, PRESERVATIVE FREE 10 ML: 5 INJECTION INTRAVENOUS at 09:14

## 2021-06-14 ASSESSMENT — ENCOUNTER SYMPTOMS
NAUSEA: 0
COUGH: 1
DIARRHEA: 0
SHORTNESS OF BREATH: 1
ABDOMINAL PAIN: 0
BLOOD IN STOOL: 0
CHEST TIGHTNESS: 0
VOMITING: 0
CONSTIPATION: 0
WHEEZING: 0

## 2021-06-14 ASSESSMENT — PAIN SCALES - GENERAL: PAINLEVEL_OUTOF10: 0

## 2021-06-14 NOTE — PROGRESS NOTES
Tolerated  Required Braces or Orthoses?: No  Vision/Hearing  Vision: Impaired  Vision Exceptions: Wears glasses for reading  Hearing: Within functional limits     Subjective  General  Chart Reviewed: No  Patient assessed for rehabilitation services?: Yes  Response To Previous Treatment: Not applicable  Family / Caregiver Present: No  Follows Commands: Within Functional Limits  Subjective  Subjective: Pt and RN agreeable to therapy. Pt denies pain. Pain Screening  Patient Currently in Pain: Denies  Vital Signs  Patient Currently in Pain: Denies       Orientation  Orientation  Overall Orientation Status: Within Functional Limits  Social/Functional History  Social/Functional History  Lives With: Alone  Type of Home: Apartment  Home Layout: One level  Home Access: Stairs to enter with rails (Apartment on second floor)  Entrance Stairs - Number of Steps: 12  Entrance Stairs - Rails: Right  Bathroom Shower/Tub: Tub/Shower unit  Bathroom Toilet: Standard  Bathroom Equipment:  (Pt states she has towel rack and window sill.)  Bathroom Accessibility: Not accessible  Receives Help From: Family  ADL Assistance: Independent  Homemaking Assistance: Independent  Ambulation Assistance: Independent  Transfer Assistance: Independent  Active : Yes  Mode of Transportation: Car  Occupation: Retired  Type of occupation: senior care work at [employer]  Overall Cognitive Status: WFL    Objective  AROM RLE (degrees)  RLE AROM: WFL  AROM LLE (degrees)  LLE AROM : WFL  AROM RUE (degrees)  RUE AROM : WFL  RUE General AROM: Co-eval with OT - see OT evaluation for full UE assessment. AROM LUE (degrees)  LUE AROM : WFL  LUE General AROM: Co-eval with OT - see OT evaluation for full UE assessment.   Strength RLE  Strength RLE: WFL  R Hip Flexion: 4+/5  R Knee Flexion: 4+/5  R Knee Extension: 4+/5  R Ankle Dorsiflexion: 4+/5  R Ankle Plantar flexion: 4+/5  Strength LLE  Strength LLE: WFL  L Hip Flexion: 4+/5  L Knee Flexion: 4+/5  L Knee Extension: 4+/5  L Ankle Dorsiflexion: 4+/5  L Ankle Plantar Flexion: 4+/5  Strength RUE  Strength RUE: WFL  Comment: Co-eval with OT - see OT evaluation for full UE assessment. Strength LUE  Strength LUE: WFL  Comment: Co-eval with OT - see OT evaluation for full UE assessment. Sensation  Overall Sensation Status: Impaired (Numbness and tingling in her finger tips secondary to herniated discs in her cervical spine.)  Bed mobility  Supine to Sit: Stand by assistance  Sit to Supine: Stand by assistance  Scooting: Stand by assistance  Comment: Pt supine in bed upon arrival and retired to bed at end of session. Bed mobility assessed with HOB at ~35 degrees. Transfers  Sit to Stand: Stand by assistance  Stand to sit: Stand by assistance  Comment: Transfers assessed without AD. Ambulation  Ambulation?: Yes  More Ambulation?: No  Ambulation 1  Surface: level tile  Device: No Device  Assistance: Stand by assistance  Quality of Gait: variable rehana, decreased step length, increased rest breaks and visible labored breathing  Gait Deviations: Decreased step length  Distance: 200' x1  Comments: Standing rest breaks (~2 minutes each) taken at 48' and 100'. SpO2 taken throughout ambulation - lowest SpO2 was 89%. SpO2 recovered to <92% with standing rest breaks and cues for deep, slow breathing. Pt visibly anxious and frustrated with increase of SOB. Stairs/Curb  Stairs?: No     Balance  Posture: Fair  Sitting - Static: Good  Sitting - Dynamic: Good  Standing - Static: Good;-  Standing - Dynamic: Good;-  Comments: Standing balance assessed without AD.         Plan   Plan  Times per week: 5-6x  Times per day: Daily  Current Treatment Recommendations: Strengthening, Balance Training, Functional Mobility Training, Stair training, Gait Training, Endurance Training, Neuromuscular Re-education  Safety Devices  Type of devices: Left in chair, Nurse notified, Gait belt, Call light within reach  Restraints  Initially in place: No    AM-PAC Score  AM-PAC Inpatient Mobility Raw Score : 16 (06/14/21 1233)  AM-PAC Inpatient T-Scale Score : 40.78 (06/14/21 1233)  Mobility Inpatient CMS 0-100% Score: 54.16 (06/14/21 1233)  Mobility Inpatient CMS G-Code Modifier : CK (06/14/21 1233)    Goals  Short term goals  Time Frame for Short term goals: 14 visits  Short term goal 1: Pt ambulates 200' independently without standing rest breaks to improve endurance and facilitate community ambulation distances. Short term goal 2: Pt negotiates 4 stairs with L hand rail modified independent to aid return to prior living situation. Short term goal 3: Pt tolerates 30 minutes of therapy with self recognition of need for rest breaks and implementation of deep breathing techniques to improve endurance with functional mobility. Patient Goals   Patient goals : Pt to return to prior living situation. Therapy Time   Individual Concurrent Group Co-treatment   Time In 1049         Time Out 1127         Minutes 38         Timed Code Treatment Minutes: 8 Minutes       ZACKARY Grijalva  Evaluation/treatment performed by Student PT under the supervision of co-signing PT who agrees with all evaluation/treatment and documentation.

## 2021-06-14 NOTE — CARE COORDINATION
[facility] ICU Quality Flow/Interdisciplinary Rounds Progress Note    Quality Flow Rounds held on June 14, 2021 at [address] Attending:  Bedside Nurse, , Nursing Unit Leadership, Dietary, Respiratory Therapy, Physical Therapy, Occupational Therapy and Spiritual Care/    Anticipated Discharge Date:  Expected Discharge Date: 06/14/21    Anticipated Discharge Disposition: home    Readmission Risk              Risk of Unplanned Readmission:  12           Discussed patient goal for the day, patient clinical progression, and barriers to discharge.   The following Goal(s) of the Day/Commitment(s) have been identified:  Activity Progression  as francisca and IV steroids      Bianca Asif RN  June 14, 2021

## 2021-06-14 NOTE — CARE COORDINATION
Case Management Initial Discharge Plan  Crow Foreman,             Met with:patient to discuss discharge plans. Information verified: address, contacts, phone number, , insurance Yes  Insurance Provider: Medicare, [facility]    Emergency Contact/Next of Rodger De La Rosa name & number: cecil Bains [phone] and daughter Emperatriz Cantu [phone]  Who are involved in patient's support system? children    PCP: No primary care provider on file. Date of last visit: needs PCP      Discharge Planning    Living Arrangements:  Alone     Home has 1 stories  12 stairs to climb to get into front door  Location of bedroom/bathroom in home 1    Patient able to perform ADL's:Independent    Current Services (outpatient & in home) none  DME equipment: nebulizer  DME provider:     Is patient receiving oral anticoagulation therapy? No    Potential Assistance Needed:  N/A    Patient agreeable to home care: No  [place] of choice provided:  no    Prior SNF/Rehab Placement and Facility: no  Agreeable to SNF/Rehab: No  [place] of choice provided: no     Evaluation: no    Expected Discharge date:  21    Patient expects to be discharged to:  home    If home: is the family and/or caregiver wiling & able to provide support at home? yes  Who will be providing this support? children    Follow Up Appointment: Best Day/ Time: Monday AM    Transportation provider: self, children  Transportation arrangements needed for discharge: depends on time of day  Readmission Risk              Risk of Unplanned Readmission:  12             Does patient have a readmission risk score greater than 14?: No  If yes, follow-up appointment must be made within 7 days of discharge.      Goals of Care: better breathing      Educated patient on transitional options, provided freedom of choice and are agreeable with plan      Discharge Plan: home, independent - needs PCP appt          Electronically signed by April Lara RN on 21 at 8:25 AM EDT    [id number] follow up appt made with Jacy Portillo NP - new patient to establish care - 6/18 at 11:15am - request call to office so they can make transition call.

## 2021-06-14 NOTE — PROGRESS NOTES
belt  Restraints  Initially in place: No           Patient Diagnosis(es): The primary encounter diagnosis was COPD exacerbation ([facility] [address] A diagnosis of REED (dyspnea on exertion) was also pertinent to this visit. has a past medical history of Asthma, COPD (chronic obstructive pulmonary disease) ([facility] [address] Fibromyalgia, Herniated disc, cervical, Herniated lumbar intervertebral disc, and Kidney disease. has a past surgical history that includes Hysterectomy and Dental surgery.     Restrictions  Restrictions/Precautions  Restrictions/Precautions: Fall Risk, Up as Tolerated  Required Braces or Orthoses?: No    Subjective   General  Patient assessed for rehabilitation services?: Yes  Family / Caregiver Present: No  Subjective  Subjective: RN ok'd for eval this AM  Patient Currently in Pain: Denies    Oxygen Therapy  SpO2: 95 %  Pulse Oximeter Device Mode: Intermittent  Pulse Oximeter Device Location: Right;Finger  O2 Device: None (Room air)    Social/Functional History  Social/Functional History  Lives With: Alone  Type of Home: Apartment  Home Layout: One level  Home Access: Stairs to enter with rails (Apartment on second floor)  Entrance Stairs - Number of Steps: 12  Entrance Stairs - Rails: Right  Bathroom Shower/Tub: Tub/Shower unit  Bathroom Toilet: Standard  Bathroom Equipment:  (Pt states she has towel rack and window sill.)  Bathroom Accessibility: Not accessible  Receives Help From: Family  ADL Assistance: Independent  Homemaking Assistance: Independent  Ambulation Assistance: Independent  Transfer Assistance: Independent  Active : Yes  Mode of Transportation: Car  Occupation: Retired  Type of occupation: shelter work at [employer]     Objective   Vision: Impaired  Vision Exceptions: Wears glasses for reading  Hearing: Within functional limits    Orientation  Overall Orientation Status: Within Functional Limits     Balance  Sitting Balance: Stand by assistance (pt seated EOB unsupported for static/dyamic sitting balance ~4mins; O2 saturation rate 94-95%)  Standing Balance: Stand by assistance  Standing Balance  Time: ~5mins  Activity: Pt stood sink side to perform UB grooming ADL tasks  Comment: Pt observed w/not true LOB however mild impulsive during ADL tasks; pt required deep breathing techniques and verbal cues to incorporate during tasks    Functional Mobility  Functional - Mobility Device: No device  Activity: To/from bathroom (to/from bathroom; hospital room and hallway)  Assist Level: Stand by assistance  Functional Mobility Comments: During functional mobility pt w/O2 saturation 89-92%, pt required x2 rest breaks and recovered quickly above 94% w/encouraged deep breathing techniques w/good return    Toilet Transfers  Toilet - Technique: Ambulating  Equipment Used: Standard toilet  Toilet Transfer: Stand by assistance  Toilet Transfers Comments: pt peformed toliet transfer using R/L grab bars for balance assistance during descent/ascent    ADL  Feeding: Modified independent ; Increased time to complete  Grooming: Stand by assistance; Increased time to complete (pt performed hand hygiene at sink side)  UE Bathing: Stand by assistance; Increased time to complete  LE Bathing: Stand by assistance; Increased time to complete  UE Dressing: Stand by assistance; Increased time to complete  LE Dressing: Stand by assistance; Increased time to complete (pt crystal R/L hospital socks seated EOB)  Toileting: Stand by assistance; Increased time to complete (pt performed pericare/bottom hygiene/brief management)  Additional Comments: Pt required increased time secondary to labored breathing/SOB during functional tasks; pt observed w/mild impulsivity w/verbal cues to encourage deep breathing techniques/EC strategies     Tone RUE  RUE Tone: Normotonic  Tone LUE  LUE Tone: Normotonic        Bed mobility  Supine to Sit: Stand by assistance  Sit to Supine: Stand by assistance  Scooting: Stand by assistance  Comment: HOB elevated ~35 degrees; pt Score: 32.79 (06/14/21 1225)  ADL Inpatient CMS G-Code Modifier : CJ (06/14/21 1225)    Goals  Short term goals  Time Frame for Short term goals: 14 visits  Short term goal 1: Pt will perform functional mobility/transfers w/mod I using least restrictive adaptive device  Short term goal 2: Pt will demo 10+mins of standing tolerance w/mod I using least restrictive adaptive device to engage in functional tasks  Short term goal 3: Pt will independently demo UB/LB ADLs to engage in functional tasks  Short term goal 4: Pt will incorporate 3 EC/WS to increase participation in functional ADLs  Short term goal 5: Pt will independently demo good safty awareness 100% of the time during all functional transfers/mobility and functional ADL tasks       Therapy Time   Individual Concurrent Group Co-treatment   Time In 1054         Time Out 1126         Minutes 32         Timed Code Treatment Minutes: 1282 [facility] S/OT

## 2021-06-14 NOTE — PROGRESS NOTES
[facility]  Office: [address], DO, Kendall Benavidez, DO, Cassandra Kayla, DO, Femi Covert Blood, DO, Tanna Prakash MD, Dilip James MD, Alex Diaz MD, Missy Owens MD, Collette Denmark, MD, Chandu Argueta MD, Ariana Corrigan MD, Amauri Neal MD, Jovon Skinner, DO, Lenora Rojo MD, Nicki Mays, DO, Kiki Pickett MD,  Adriana Corea DO, Gina Valentine MD, Eliott Spatz, MD, Don Craig MD, Estefany Herrera MD, Angel Nieto, [facility], [place] [address] [address], [facility], Trevor Roman, CNP, Digna Gunn, CNS, Gem Bullock, CNP, Jessica Amaya, CNP, Sandee Hastings, CNP, Rosa Wisdom, CNP, Erica Juarez, CNP, Albert Escobedo PA-C, Anastasiia Solorzano, [facility], Duane Abed, CNP, Keisha Wyman, CNP, Lanie Parra, CNP, Kevyn Marroquin, CNP, Herbert Durham, CNP, Priscilla Fernandez, [address]    Progress Note    6/14/2021    2:27 PM    Name:   Salena Bah  MRN:     [id number]     Acct:      [de-identified]   Room:   [address] [place] [address] Day:  2  Admit Date:  6/12/2021  5:05 PM    PCP:   No primary care provider on file. Code Status:  Full Code    Subjective:     C/C:   Chief Complaint   Patient presents with    Shortness of Breath    Cough     Interval History Status: significantly improved. Patient seen and examined at bedside, no acute events overnight. Continue to improve overall with better breathing. Cough is much better  Patient denies any chest pain, breath, chills, fevers, nausea or vomiting. Patient vitals, labs and all providers notes were reviewed,from overnight shift and morning updates were noted and discussed with the nurse    Brief History: This is a pleasant 69-year-old female with known history of's asthma and COPD who just moved back from living with her son in [place] as her lung condition was worsening due to weather and allergies.   She presented to our ER with worsening shortness of breath despite breathing treatments at home, she follows up with Dr. Keshawn Sherman pulmonology as an outpatient. Patient needed admission for further management as she did not respond to interventions in ER and was noted to be hypoxic and needed supplemental oxygen  During exam she is short of breath after speaking in sentences she has diminished air exchange and diffuse scattered wheezing        Review of Systems:     Review of Systems   Constitutional: Positive for activity change and appetite change. Negative for chills, diaphoresis and fever. HENT: Negative for congestion. Eyes: Negative for visual disturbance. Respiratory: Positive for cough and shortness of breath. Negative for chest tightness and wheezing. Cardiovascular: Negative for chest pain, palpitations and leg swelling. Gastrointestinal: Negative for abdominal pain, blood in stool, constipation, diarrhea, nausea and vomiting. Genitourinary: Negative for difficulty urinating. Neurological: Negative for dizziness, weakness, light-headedness, numbness and headaches. All other systems reviewed and are negative. Medications: Allergies:     Allergies   Allergen Reactions    Oxycodone-Acetaminophen      Other reaction(s): Unknown    Penicillin G Sodium Rash    Penicillins Rash       Current Meds:   Scheduled Meds:    levoFLOXacin  500 mg Oral Daily    montelukast  10 mg Oral Nightly    insulin lispro  0-6 Units Subcutaneous TID WC    insulin lispro  0-3 Units Subcutaneous Nightly    famotidine  20 mg Oral BID    budesonide-formoterol  2 puff Inhalation BID    sodium chloride flush  5-40 mL Intravenous 2 times per day    enoxaparin  40 mg Subcutaneous Daily    ipratropium-albuterol  1 ampule Inhalation Q4H WA    methylPREDNISolone  40 mg Intravenous Q6H    Followed by   Esaw Arnol ON 6/15/2021] predniSONE  40 mg Oral Daily     Continuous Infusions:    dextrose      sodium chloride       PRN Meds: glucose, dextrose, glucagon (rDNA), dextrose, HYDROcodone-chlorpheniramine, benzocaine-menthol, sodium chloride flush, sodium chloride, ondansetron **OR** ondansetron, polyethylene glycol, acetaminophen **OR** acetaminophen, albuterol    Data:     Past Medical History:   has a past medical history of Asthma, COPD (chronic obstructive pulmonary disease) ([address] Fibromyalgia, Herniated disc, cervical, Herniated lumbar intervertebral disc, and Kidney disease. Social History:   reports that she quit smoking about 7 years ago. She has never used smokeless tobacco. She reports current alcohol use of about 3.0 standard drinks of alcohol per week. She reports that she does not use drugs. Family History:   Family History   Problem Relation Age of Onset    Diabetes Mother     Heart Disease Father        Vitals:  BP (!) 171/72   Pulse 95   Temp 97.5 °F (36.4 °C) (Axillary)   Resp 24   Ht 5' 5\" (1.651 m)   Wt 110 lb 10.7 oz (50.2 kg)   LMP  (LMP Unknown)   SpO2 95%   Breastfeeding No   BMI 18.42 kg/m²   Temp (24hrs), Av.9 °F (36.6 °C), Min:97.5 °F (36.4 °C), Max:98.2 °F (36.8 °C)    Recent Labs     21  1644 21  0731 21  1225   POCGLU 144* 145* 128* 128*       I/O (24Hr):     Intake/Output Summary (Last 24 hours) at 2021 1427  Last data filed at 2021 0912  Gross per 24 hour   Intake 3146.66 ml   Output 750 ml   Net 2396.66 ml       Labs:  Hematology:  Recent Labs     21  0240   WBC 8.4 6.5   RBC 4.13 3.88*   HGB 13.6 12.7   HCT 40.8 38.5   MCV 98.9 99.2   MCH 33.0 32.8   MCHC 33.3 33.1   RDW 13.8 14.0    326   MPV 7.6 7.4   DDIMER 0.32  --      Chemistry:  Recent Labs     21  0240 21  0844 21  0848     --  137  --  139   K 3.8  --  4.5  --  4.0     --  107  --  108*   CO2 23  --  23  --  21   GLUCOSE 143*  --  191*  --  188*   BUN 20  --  25*  --  22   CREATININE 0.79  --  0.80  --  0.68   ANIONGAP 11  --  7*  --  10 Cranial Nerves: No cranial nerve deficit. Motor: No seizure activity. Psychiatric:         Speech: Speech normal.         Behavior: Behavior normal. Behavior is cooperative. Assessment:     Hospital Problems         Last Modified POA    * (Principal) COPD exacerbation ([facility] [address] 6/13/2021 Yes    Essential hypertension 6/13/2021 Yes    REED (dyspnea on exertion) 6/13/2021 Yes    Stage 3 chronic kidney disease ([facility] [address] 6/13/2021 Yes    Hypoxia 6/13/2021 Yes          Plan:     COPD exacerbation: continue breathing treatment, continue steroids, mucolytics and home aerosols    Asthma exacerbation: On steroids and Singulair    Hypoxia : Secondary to above currently resolved    History of tobacco abuse.     Essential hypertension: Continue home medications    DVT and GI prophylaxis    Discharge planning hopefully in the next 24 hours      Andrea Esposito MD  6/14/2021  2:27 PM

## 2021-06-14 NOTE — FLOWSHEET NOTE
SITUATION:  Writer received a spiritual consult for Patient. Writer met with Patientt in the inpatient unit. ASSESSMENT:  Patient expressed that she was doing well and that she \"was feeling better. \" Patient spoke about her children and grandchildren as her support system. Patient acknowledged her rogelio as a source of support. Patient spoke about her illness and its effects. Patient spoke about her family's medical history and she acknowledged that several of her family members have pasted away. INTERVENTION:  Writer introduced himself and his services; asked about how Patient's day was going; inquired about Patient's family and support system; affirmed Patient's support and coping system;  inquired about Patient's rogelio; listened while Patient spoke about her illness, its effects, and her family's medical history. OUTCOME:  Patient expressed gratitude for visit    PLAN:  No follow-up is needed at this time. 06/14/21 1324   Encounter Summary   Services provided to: Patient   Referral/Consult From: Other disciplines   Support System Family members; Children;Adventist/rogelio community   Continue Visiting   (6/14/21)   Complexity of Encounter Moderate   Length of Encounter 30 minutes   Spiritual Assessment Completed Yes   Routine   Type Initial   Spiritual/Scientologist   Type Spiritual support   Assessment Calm; Approachable; Hopeful;Coping   Intervention Active listening;Explored feelings, thoughts, concerns;Explored coping resources;Nurtured hope;Sustaining presence/ Ministry of presence; Discussed belief system/Muslim practices/rogelio;Discussed illness/injury and it's impact   Outcome Expressed gratitude;Engaged in conversation;Coping;Receptive; Hopeful           Electronically signed by Chaplain John Intern on 6/14/2021 at 1:30 PM

## 2021-06-14 NOTE — PLAN OF CARE
Problem: Falls - Risk of:  Goal: Will remain free from falls  Description: Will remain free from falls  6/14/2021 1301 by Deuce Pedraza RN  Outcome: Ongoing     Problem: Breathing Pattern - Ineffective:  Goal: Ability to achieve and maintain a regular respiratory rate will improve  Description: Ability to achieve and maintain a regular respiratory rate will improve  6/14/2021 1301 by Deuce Pedraza RN  Outcome: Ongoing     Problem: Respiratory:  Goal: Ability to maintain a clear airway will improve  Description: Ability to maintain a clear airway will improve  6/14/2021 1301 by Deuce Pedraza RN  Outcome: Ongoing     Problem: Respiratory:  Goal: Ability to maintain adequate ventilation will improve  Description: Ability to maintain adequate ventilation will improve  6/14/2021 1301 by Deuce Pedraza RN  Outcome: Ongoing  Note: Oxygen administered as needed. Pulse oximetry levels WNL. No cyanosis noted. Repositioned to encourage proper ventilation.  Continue to monitor

## 2021-06-15 VITALS
RESPIRATION RATE: 18 BRPM | DIASTOLIC BLOOD PRESSURE: 89 MMHG | HEART RATE: 88 BPM | OXYGEN SATURATION: 95 % | WEIGHT: 110.67 LBS | HEIGHT: 65 IN | BODY MASS INDEX: 18.44 KG/M2 | SYSTOLIC BLOOD PRESSURE: 177 MMHG | TEMPERATURE: 97.7 F

## 2021-06-15 PROBLEM — J42 CHRONIC BRONCHITIS (HCC): Status: ACTIVE | Noted: 2021-06-15

## 2021-06-15 PROBLEM — J45.901 ASTHMA EXACERBATION: Status: ACTIVE | Noted: 2021-06-12

## 2021-06-15 LAB
ANION GAP SERPL CALCULATED.3IONS-SCNC: 8 MMOL/L (ref 9–17)
BUN BLDV-MCNC: 27 MG/DL (ref 8–23)
BUN/CREAT BLD: ABNORMAL (ref 9–20)
CALCIUM SERPL-MCNC: 9 MG/DL (ref 8.6–10.4)
CHLORIDE BLD-SCNC: 108 MMOL/L (ref 98–107)
CO2: 23 MMOL/L (ref 20–31)
CREAT SERPL-MCNC: 0.75 MG/DL (ref 0.5–0.9)
EKG ATRIAL RATE: 109 BPM
EKG P AXIS: 86 DEGREES
EKG P-R INTERVAL: 162 MS
EKG Q-T INTERVAL: 328 MS
EKG QRS DURATION: 74 MS
EKG QTC CALCULATION (BAZETT): 441 MS
EKG R AXIS: 21 DEGREES
EKG T AXIS: 78 DEGREES
EKG VENTRICULAR RATE: 109 BPM
GFR AFRICAN AMERICAN: >60 ML/MIN
GFR NON-AFRICAN AMERICAN: >60 ML/MIN
GFR SERPL CREATININE-BSD FRML MDRD: ABNORMAL ML/MIN/{1.73_M2}
GFR SERPL CREATININE-BSD FRML MDRD: ABNORMAL ML/MIN/{1.73_M2}
GLUCOSE BLD-MCNC: 106 MG/DL (ref 70–99)
GLUCOSE BLD-MCNC: 124 MG/DL (ref 65–105)
GLUCOSE BLD-MCNC: 92 MG/DL (ref 65–105)
POTASSIUM SERPL-SCNC: 3.8 MMOL/L (ref 3.7–5.3)
SODIUM BLD-SCNC: 139 MMOL/L (ref 135–144)

## 2021-06-15 PROCEDURE — 82947 ASSAY GLUCOSE BLOOD QUANT: CPT

## 2021-06-15 PROCEDURE — 6370000000 HC RX 637 (ALT 250 FOR IP): Performed by: FAMILY MEDICINE

## 2021-06-15 PROCEDURE — 6370000000 HC RX 637 (ALT 250 FOR IP): Performed by: NURSE PRACTITIONER

## 2021-06-15 PROCEDURE — 2580000003 HC RX 258: Performed by: NURSE PRACTITIONER

## 2021-06-15 PROCEDURE — 80048 BASIC METABOLIC PNL TOTAL CA: CPT

## 2021-06-15 PROCEDURE — 97116 GAIT TRAINING THERAPY: CPT

## 2021-06-15 PROCEDURE — 97535 SELF CARE MNGMENT TRAINING: CPT

## 2021-06-15 PROCEDURE — 99239 HOSP IP/OBS DSCHRG MGMT >30: CPT | Performed by: FAMILY MEDICINE

## 2021-06-15 PROCEDURE — 94760 N-INVAS EAR/PLS OXIMETRY 1: CPT

## 2021-06-15 PROCEDURE — 94640 AIRWAY INHALATION TREATMENT: CPT

## 2021-06-15 PROCEDURE — 6360000002 HC RX W HCPCS: Performed by: NURSE PRACTITIONER

## 2021-06-15 PROCEDURE — 97110 THERAPEUTIC EXERCISES: CPT

## 2021-06-15 PROCEDURE — 36415 COLL VENOUS BLD VENIPUNCTURE: CPT

## 2021-06-15 RX ORDER — LOSARTAN POTASSIUM 25 MG/1
25 TABLET ORAL DAILY
Qty: 30 TABLET | Refills: 3 | Status: SHIPPED | OUTPATIENT
Start: 2021-06-16 | End: 2021-10-21

## 2021-06-15 RX ORDER — LOSARTAN POTASSIUM 25 MG/1
25 TABLET ORAL DAILY
Status: DISCONTINUED | OUTPATIENT
Start: 2021-06-15 | End: 2021-06-15 | Stop reason: HOSPADM

## 2021-06-15 RX ORDER — PREDNISONE 10 MG/1
TABLET ORAL
Qty: 17 TABLET | Refills: 0 | Status: SHIPPED | OUTPATIENT
Start: 2021-06-15 | End: 2021-06-23

## 2021-06-15 RX ORDER — MONTELUKAST SODIUM 10 MG/1
10 TABLET ORAL NIGHTLY
Qty: 30 TABLET | Refills: 3 | Status: SHIPPED | OUTPATIENT
Start: 2021-06-15 | End: 2021-07-19 | Stop reason: SDUPTHER

## 2021-06-15 RX ORDER — LEVOFLOXACIN 500 MG/1
500 TABLET, FILM COATED ORAL DAILY
Qty: 7 TABLET | Refills: 0 | Status: SHIPPED | OUTPATIENT
Start: 2021-06-16 | End: 2021-06-23

## 2021-06-15 RX ORDER — BENZONATATE 100 MG/1
100 CAPSULE ORAL 3 TIMES DAILY PRN
Qty: 30 CAPSULE | Refills: 0 | Status: SHIPPED | OUTPATIENT
Start: 2021-06-15 | End: 2021-08-03 | Stop reason: SDUPTHER

## 2021-06-15 RX ADMIN — LEVOFLOXACIN 500 MG: 500 TABLET, FILM COATED ORAL at 08:08

## 2021-06-15 RX ADMIN — LOSARTAN POTASSIUM 25 MG: 25 TABLET, FILM COATED ORAL at 11:33

## 2021-06-15 RX ADMIN — PREDNISONE 40 MG: 20 TABLET ORAL at 08:07

## 2021-06-15 RX ADMIN — ENOXAPARIN SODIUM 40 MG: 40 INJECTION SUBCUTANEOUS at 08:08

## 2021-06-15 RX ADMIN — IPRATROPIUM BROMIDE AND ALBUTEROL SULFATE 1 AMPULE: .5; 2.5 SOLUTION RESPIRATORY (INHALATION) at 11:47

## 2021-06-15 RX ADMIN — IPRATROPIUM BROMIDE AND ALBUTEROL SULFATE 1 AMPULE: .5; 2.5 SOLUTION RESPIRATORY (INHALATION) at 07:20

## 2021-06-15 RX ADMIN — SODIUM CHLORIDE, PRESERVATIVE FREE 10 ML: 5 INJECTION INTRAVENOUS at 08:08

## 2021-06-15 RX ADMIN — FAMOTIDINE 20 MG: 20 TABLET ORAL at 08:20

## 2021-06-15 RX ADMIN — IPRATROPIUM BROMIDE AND ALBUTEROL SULFATE 1 AMPULE: .5; 2.5 SOLUTION RESPIRATORY (INHALATION) at 15:48

## 2021-06-15 RX ADMIN — BUDESONIDE AND FORMOTEROL FUMARATE DIHYDRATE 2 PUFF: 160; 4.5 AEROSOL RESPIRATORY (INHALATION) at 07:21

## 2021-06-15 ASSESSMENT — PAIN SCALES - GENERAL: PAINLEVEL_OUTOF10: 0

## 2021-06-15 NOTE — DISCHARGE SUMMARY
[facility]  Office: [address], DO, Juany Moeller, DO, Narcisa Bowling, DO, Wilver Lino, DO, Mariangel Becerril MD, Diego Gonzalez MD, Erika Elder MD, Javier Smith MD, Kari Womack MD, Exie Ahumada, MD, German Orellana MD, Zaid Ordonez MD, Porfirio Trejo DO, Lalita Ye MD, Sandy Wills DO, Chantal Wolf MD,  Yudith Guerrier DO, Andrea Gracia MD, Serena Woodall MD, Elizabeth Sullivan MD, Margaret Wallis MD, Peña Virk [facility], [address], [facility], Antonio Fontenot, [facility], Jersey Dubose, CNS, Ezekiel Wolf, CNP, Jeremy Solareset, CNP, Elizabeth Roper, CNP, Maurice Ram, CNP, Jarod Avalos, CNP, Napoloen Jolley PA-C, Deven Ugarte, [facility], Jessika Ferraro, CNP, Jose Coronado, CNP, Gwen Betancourt, CNP, Ruben Trotter, CNP, Rufino Leal, CNP, Hunter Her[id number]    Discharge Summary     Patient ID: Elizabet Pagan  :  1950   MRN: [id number]     ACCOUNT:  [de-identified]   Patient's PCP: DIONE Flores CNP  Admit Date: 2021   Discharge Date: 6/15/2021     Length of Stay: 3  Code Status:  Full Code  Admitting Physician: Javier Smith MD  Discharge Physician: Javier Smith MD     Active Discharge Diagnoses:     Hospital Problem Lists:  Principal Problem:    Asthma exacerbation  Active Problems:    Essential hypertension    REED (dyspnea on exertion)    Stage 3 chronic kidney disease ([address]    Hypoxia    Chronic bronchitis ([facility] [address]  Resolved Problems:    * No resolved hospital problems.  *      Admission Condition:  poor     Discharged Condition: fair    Hospital Stay:     Hospital Course:       This is a pleasant 44-year-old female with known history of's asthma and COPD who just moved back from living with her son in [place] as her lung condition was worsening due to weather and allergies.  She presented to our ER with worsening shortness of breath despite breathing treatments at home, she follows up with Dr. Mittal Prost pulmonology as an outpatient. Patient needed admission for further management as she did not respond to interventions in ER and was noted to be hypoxic and needed supplemental oxygen  During exam she is short of breath after speaking in sentences she has diminished air exchange and diffuse scattered wheezing      During the admission patient was managed as follow     Asthma exacerbation: On steroids and Singulair    COPD : continue breathing treatment, continue steroids, mucolytics and home aerosols     Hypoxia : Secondary to above currently resolved     History of tobacco abuse.     Essential hypertension: Continue home medications     DVT and GI prophylaxis     Discharge     Had long detailed discussion with the patient regarding her asthma and need to follow-up with pulmonology and allergy immunology, encouraged her to buy HEPA filter for her apartment where she feels uncomfortable the most from the smell  As well patient is adamant that her blood pressure is not high on a regular basis and she does not want to take medication, I offered to send the medication to her pharmacy and when she goes home she can check her blood pressure at home with her cuff and monitor if there is any changes and pick it up if she needs it, patient also will follow up with PCP and I encouraged her to discuss blood pressure further with him.   Patient has a follow-up appointment also with her pulmonologist in 4-week she will call for update to see if she needs to get any earlier      Significant therapeutic interventions:   Significant Diagnostic Studies:   Labs / Micro:  CBC:   Lab Results   Component Value Date    WBC 6.5 06/13/2021    RBC 3.88 06/13/2021    HGB 12.7 06/13/2021    HCT 38.5 06/13/2021    MCV 99.2 06/13/2021    MCH 32.8 06/13/2021    MCHC 33.1 06/13/2021    RDW 14.0 06/13/2021     06/13/2021     CMP:    Lab Results   Component Value Date    GLUCOSE 106 06/15/2021     06/15/2021    K 3.8 06/15/2021     06/15/2021    CO2 23 06/15/2021    BUN 27 06/15/2021    CREATININE 0.75 06/15/2021    ANIONGAP 8 06/15/2021    LABGLOM >60 06/15/2021    GFRAA >60 06/15/2021    GFR      06/15/2021    GFR NOT REPORTED 06/15/2021    CALCIUM 9.0 06/15/2021     PTT: No results found for: APTT  FLP:  No results found for: CHOL, TRIG, HDL  U/A:  No results found for: Marget Arianna, SPECGRAV, HGBUR, PHUR, PROTEINU, GLUCOSEU, KETUA, BILIRUBINUR, UROBILINOGEN, NITRU, LEUKOCYTESUR  TSH:  No results found for: TSH     Radiology:  XR CHEST (2 VW)    Result Date: 6/12/2021  Hyperinflated, clear lungs. XR CHEST PORTABLE    Result Date: 6/13/2021  Hyperexpanded lung volume can be seen in the setting of COPD. No focal pulmonary consolidation.        Consultations:    Consults:     Final Specialist Recommendations/Findings:   IP CONSULT TO Manjeet      The patient was seen and examined on day of discharge     A&O X 3  Air exchange is significantly better, no audible wheezing today  NSR, NO MRG  Soft abdomen , +BS  No swelling  and pulse palpable      Discharge plan:     Disposition: Home    Physician Follow Up:     Patient has a scheduled appointment on 6/18 was DIONE Barrientos    Requiring Further Evaluation/Follow Up POST HOSPITALIZATION/Incidental Findings:     Diet: cardiac diet    Activity: As tolerated    Instructions to Patient:     Discharge Medications:      Medication List      START taking these medications    benzonatate 100 MG capsule  Commonly known as: TESSALON  Take 1 capsule by mouth 3 times daily as needed for Cough     levoFLOXacin 500 MG tablet  Commonly known as: LEVAQUIN  Take 1 tablet by mouth daily for 7 days  Start taking on: June 16, 2021     losartan 25 MG tablet  Commonly known as: COZAAR  Take 1 tablet by mouth daily  Start taking on: June 16, 2021     montelukast 10 MG tablet  Commonly known as: SINGULAIR  Take 1 tablet by mouth nightly     predniSONE 10 MG tablet  Commonly known as: DELTASONE  Take 3 tablets by mouth daily for 3 days, THEN 2 tablets daily for 3 days, THEN 1 tablet daily for 2 days. Start taking on: Christine 15, 2021        CONTINUE taking these medications    Symbicort 160-4.5 MCG/ACT Aero  Generic drug: budesonide-formoterol     * Ventolin  (90 Base) MCG/ACT inhaler  Generic drug: albuterol sulfate HFA     * albuterol (5 MG/ML) 0.5% nebulizer solution  Commonly known as: PROVENTIL         * This list has 2 medication(s) that are the same as other medications prescribed for you. Read the directions carefully, and ask your doctor or other care provider to review them with you. Where to Get Your Medications      [facility], [address] [place] [address]    Phone: [phone]   · benzonatate 100 MG capsule  · levoFLOXacin 500 MG tablet  · losartan 25 MG tablet  · montelukast 10 MG tablet  · predniSONE 10 MG tablet         No discharge procedures on file. Time Spent on discharge is  35 mins in patient examination, evaluation, counseling as well as medication reconciliation, prescriptions for required medications, discharge plan and follow up. Electronically signed by   Sofia Srivastava MD  6/15/2021  4:37 PM      Thank you DIONE Pulliam - YEN for the opportunity to be involved in this patient's care.

## 2021-06-15 NOTE — CARE COORDINATION
[facility] ICU Quality Flow/Interdisciplinary Rounds Progress Note    Quality Flow Rounds held on Christine 15, 2021 at [address] Attending:  Bedside Nurse, , Nursing Unit Leadership, Dietary, Respiratory Therapy, Physical Therapy, Occupational Therapy and Spiritual Care/    Anticipated Discharge Date:  Expected Discharge Date: 06/14/21    Anticipated Discharge Disposition: home    Readmission Risk              Risk of Unplanned Readmission:  12           Discussed patient goal for the day, patient clinical progression, and barriers to discharge.   The following Goal(s) of the Day/Commitment(s) have been identified:  Discharge - Obtain Order and Establish a PCP/Follow up Provider - appt made with new provider and placed on AVS      [address] Leiva RN  Christine 15, 2021

## 2021-06-15 NOTE — PLAN OF CARE
Problem: Falls - Risk of:  Siderails up x 2  Hourly rounding  Call light in reach  Instructed to call for assist before attempting out of bed. Remains free from falls and accidental injury at this time   Floor free from obstacles  Bed is locked and in lowest position  Adequate lighting provided  Bed alarm on, Red Falling star and Stay with Me signs posted      Goal: Will remain free from falls  Description: Will remain free from falls  6/14/2021 2128 by Howard Walls RN  Outcome: Ongoing  6/14/2021 1301 by Josse Soto RN  Outcome: Ongoing  Goal: Absence of physical injury  Description: Absence of physical injury  Outcome: Ongoing     Problem: Breathing Pattern - Ineffective:  Goal: Ability to achieve and maintain a regular respiratory rate will improve  Description: Ability to achieve and maintain a regular respiratory rate will improve  6/14/2021 2128 by Howard Walls RN  Outcome: Ongoing  6/14/2021 1301 by Josse Soto RN  Outcome: Ongoing     Problem: Respiratory:  Goal: Ability to maintain a clear airway will improve  Description: Ability to maintain a clear airway will improve  6/14/2021 2128 by Howard Walls RN  Outcome: Ongoing  6/14/2021 1301 by Josse Soto RN  Outcome: Ongoing  Goal: Ability to maintain adequate ventilation will improve  Description: Ability to maintain adequate ventilation will improve  6/14/2021 2128 by Howard Walls RN  Outcome: Ongoing  6/14/2021 1301 by Josse Soto RN  Outcome: Ongoing  Note: Oxygen administered as needed. Pulse oximetry levels WNL. No cyanosis noted. Repositioned to encourage proper ventilation.  Continue to monitor

## 2021-06-15 NOTE — PROGRESS NOTES
Occupational Therapy  Facility/Department: [facility] SURG ICU  Daily Treatment Note  NAME: Josephine Garcia  : 1950  MRN: [id number]    Date of Service: 6/15/2021    Discharge Recommendations:  Continue to assess pending progress     OT Equipment Recommendations  Equipment Needed: Yes  Mobility Devices: ADL Assistive Devices  ADL Assistive Devices: Hand-held Shower;Grab Bars - toilet;Grab Bars - shower; Shower Chair with back    Assessment   Performance deficits / Impairments: Decreased functional mobility ; Decreased ADL status; Decreased strength;Decreased safe awareness;Decreased endurance;Decreased sensation;Decreased high-level IADLs;Decreased balance  Assessment: pt would continue to benefit from skilled OT services during hospitalization for further education/ incorporation of EC techniques and to promote increased participation and independence to safely engage in functional activities. Pt expectant for safe return to prior living arrangement with inital supervision and assistance secondary to mild impulsivity noted/reduced awareness of deficits upon SOB onset when engaging in fucntional activities. Prognosis: Good  OT Education: OT Role;Plan of Care;Energy Conservation;Precautions; Equipment;ADL Adaptive Strategies  Patient Education: EC techniques (pacing - seated ADLs/IADLs - plan for day - pursed lip breathing)  REQUIRES OT FOLLOW UP: Yes  Activity Tolerance  Activity Tolerance: Patient Tolerated treatment well  Activity Tolerance: increased cueing required for incorporation of EC techniques/education provided  Safety Devices  Safety Devices in place: Yes  Type of devices: Call light within reach; Left in bed;Nurse notified  Restraints  Initially in place: No         Patient Diagnosis(es): The primary encounter diagnosis was COPD exacerbation ([facility] [address] A diagnosis of REED (dyspnea on exertion) was also pertinent to this visit.       has a past medical history of Asthma, COPD (chronic obstructive pulmonary disease) ([facility] [address] Fibromyalgia, Herniated disc, cervical, Herniated lumbar intervertebral disc, and Kidney disease. has a past surgical history that includes Hysterectomy and Dental surgery.     Restrictions  Restrictions/Precautions  Restrictions/Precautions: Fall Risk  Required Braces or Orthoses?: No  Position Activity Restriction  Other position/activity restrictions: Up as tolerated     Subjective   General  Patient assessed for rehabilitation services?: Yes  Response to previous treatment: Patient with no complaints from previous session  Family / Caregiver Present: No  Subjective  Subjective: RN ok'd for tx this AM. Pt reports eager \"to move around instead of sitting in bed\"  Vital Signs  Patient Currently in Pain: Denies      Orientation  Orientation  Overall Orientation Status: Within Functional Limits     Objective    ADL  Equipment Provided: Other (comment) (shower chair - hand held shower hose)  Grooming: Minimal assistance;Stand by assistance;Verbal cueing (Min A while seated to apply shampoo and wash hair secondary to increased SOB; SBA required while standing, with bent forward position, to brush/dry hair)  UE Bathing: Contact guard assistance;Verbal cueing (UB bathing performed in standing - VCs for pacing; CGA required for dry back)  LE Bathing: Stand by assistance;Supervision;Verbal cueing (LB washing perform while standing w/SBA - LB drying performed while seated with supervision - VCs required to monitor pace)  UE Dressing: Contact guard assistance (performed in standing to don gown)  LE Dressing: Supervision (seated on shower chair to thread and don underwear)  Toileting: Supervision  Additional Comments: Pt educated on importance and benefit of incorporating energy conservation techniques (seated ADLs, pacing, and pursed lip breathing) upon exacerbation/SOB onset with ADLs, pt verbalized understanding following SpO2 monitoring throughout showering this date - 93% upon beginning of session standing in bathroom - 88/89% upon standing and LB/UB bathing in shower - 94% upon sitting on shower chair following shower. Pt reported recognition of benefit of shower chair when showering to aid with pacing and upon SOB management.)        Balance  Sitting Balance: Supervision (seated intermittently, with supervision, ~15 minutes total, during toileting, showering and during LB dresssing.)  Standing Balance: Contact guard assistance (close SBA-CGA required for ~15 minutes,w/o device and intermittent use of grab bars during UB/LB bathing and dressing and grooming tasks at sink)  Standing Balance  Comment: Mild lateral deviation/LOB noted while standing in shower when turning with eyes closed when washing face - SBA required and pt paused and reached for grab bar for increased stabilization. 1x pt reported \"feeling wobbly\" following showering with bend forward position to brush/dry hair - SBA required during. Functional Mobility  Functional - Mobility Device: No device  Activity: To/from bathroom (and within room)  Assist Level: Supervision  Functional Mobility Comments: Pt mild impulsive for functional mobility throughout session, verbal cues required for safety awareness and to notify RN of when planning to get up and move. Toilet Transfers  Toilet - Technique: Ambulating  Equipment Used: Standard toilet  Toilet Transfer: Supervision    Bed mobility  Sit to Supine: Modified independent  Comment: Upon arrival, pt standing at end of bed performing bed making - pt reports \"tidying things up this morning\". Pt mod IND for sit>supine following session.      Transfers  Sit to stand: Supervision;Stand by assistance  Stand to sit: Supervision;Stand by assistance  Transfer Comments: functional transfers with supervision demo'd within room; SBA required during shower with use of shower chair - intermittent VCs required for use of grab bars throughout shower                       Cognition  Overall Cognitive Status: Exceptions  Following Commands: Follows multistep commands consistently  Attention Span: Attends with cues to redirect  Safety Judgement: Decreased awareness of need for assistance;Decreased awareness of need for safety  Problem Solving: Assistance required to identify errors made;Assistance required to generate solutions;Assistance required to implement solutions  Insights: Decreased awareness of deficits  Initiation: Does not require cues  Sequencing: Does not require cues  Cognition Comment: Verbal cueing required throughout for recognition upon SOB onset for incorporation of EC techniques (pursed lip breathing - seated ADLs); Pt mildly impulsive secondary to eagerness for functional mobility and participation in ADLs.                  Plan   Plan  Times per week: 3-4x/wk  Times per day: Daily  Current Treatment Recommendations: Strengthening, Functional Mobility Training, Safety Education & Training, Patient/Caregiver Education & Training, Self-Care / ADL, Equipment Evaluation, Education, & procurement, Home Management Training, Balance Training, Endurance Training    Goals  Short term goals  Time Frame for Short term goals: 14 visits  Short term goal 1: Pt will perform functional mobility/transfer w/mod I using least restrictive device  Short term goal 2: Pt will demo 15+mins of activity standing tolerance w/mod I to engage in functional tasks  Short term goal 3: Pt will independently demo UB ADLs to engage in functional tasks  Short term goal 4: Pt will independently demo UB ADLs to engage in functional tasks  Short term goal 5: Pt will incorporate 3 EC/WS to increase participation in functional ADLs  Short term goal 6: Pt will independently demo good safty awareness 100% of the time during all functional transfers/mobility and functional ADL tasks       Therapy Time   Individual Concurrent Group Co-treatment   Time In 0853         Time Out 0945         Minutes 52         Timed Code Treatment Minutes: Kishan 1, OTR/L

## 2021-06-15 NOTE — PROGRESS NOTES
Hysterectomy and Dental surgery. Restrictions  Restrictions/Precautions  Restrictions/Precautions: Fall Risk  Required Braces or Orthoses?: No  Position Activity Restriction  Other position/activity restrictions: Up as tolerated  Subjective   General  Chart Reviewed: No  Response To Previous Treatment: Patient with no complaints from previous session. Family / Caregiver Present: No  Subjective  Subjective: Pt and Rn agreeable to therapy. Pt denies pain. Pain Screening  Patient Currently in Pain: Denies  Vital Signs  Patient Currently in Pain: Denies       Orientation  Orientation  Overall Orientation Status: Within Functional Limits  Cognition   Cognition  Overall Cognitive Status: WFL  Objective   Bed mobility  Supine to Sit: Stand by assistance  Sit to Supine: Stand by assistance  Comment: Pt supine in bed upon arrival and rtired to bed at end of session. HOB elevated ~30 degrees. Transfers  Sit to Stand: Stand by assistance  Stand to sit: Stand by assistance  Comment: Transfers assessed without AD. Ambulation  Ambulation?: Yes  More Ambulation?: No  Ambulation 1  Surface: level tile  Device: No Device  Assistance: Stand by assistance  Quality of Gait: variable rehana, decreased step length, increased rest breaks and visible labored breathing  Gait Deviations: Decreased step length  Distance: 150' x 2, 300' x2  Comments: Standing rest breaks taken at end of each 300' bout of ambulation. Rest break lasted ~4 minutes for pt to slow breath. Desaturation to 90% occurred and HR was 122. Pt able to increase SpO2 to 96% and slow HR to 108 with rest breaks and deep breathing. Pt required water and seated rest break (~3 minutes) midway through therapy session - after completion of stairs 2x and 3 bouts of ambulation. Pt more aware of need for deep breathing on this date and required less cueing. Minimal cueing still required for deep breaths and slower pace to conserve energy.   Stairs/Curb  Stairs?: Yes  Stairs  # Steps : 12  Stairs Height: 6\"  Rails: Left ascending  Device: No Device  Assistance: Stand by assistance  Comment: Pt completed 12 steps (ascending and descending) 2x. Pt's SpO2 stayed between 92-94% and HR remained <120. Verbal cues needed for pt to take rest break and deep breaths at end of each set of stairs. Pt able to descend and ascend stairs without rails, if necessary, and was able to demonstrate. Balance  Posture: Fair  Sitting - Static: Good  Sitting - Dynamic: Good  Standing - Static: Good;-  Standing - Dynamic: Good;-  Comments: Standing balance assessed without AD. Exercises  Comments: Pt given home exercise program handout. HEP consisted of standing LE exercises to improve strength, balance, and cardiovascular endurance. Writer demonstrated exercises to pt. Pt verbalized understanding and stated she has performed many of the exercises for her fibromyalgia diagnosis. Goals  Short term goals  Time Frame for Short term goals: 14 visits  Short term goal 1: Pt ambulates 200' independently without standing rest breaks to improve endurance and facilitate community ambulation distances. Short term goal 2: Pt negotiates 12 stairs with L hand rail modified independent to aid return to prior living situation. Short term goal 3: Pt tolerates 30 minutes of therapy with self recognition of need for rest breaks and implementation of deep breathing techniques to improve endurance with functional mobility. Patient Goals   Patient goals : Pt to return to prior living situation.     Plan    Plan  Times per week: 5-6x  Times per day: Daily  Current Treatment Recommendations: Strengthening, Balance Training, Functional Mobility Training, Stair training, Gait Training, Endurance Training, Neuromuscular Re-education  Safety Devices  Type of devices: Left in chair, Nurse notified, Gait belt, Call light within reach  Restraints  Initially in place: No     Therapy Time   Individual Concurrent Group Co-treatment

## 2021-06-16 ENCOUNTER — CARE COORDINATION (OUTPATIENT)
Dept: CASE MANAGEMENT | Age: 71
End: 2021-06-16

## 2021-06-16 DIAGNOSIS — J45.901 EXACERBATION OF ASTHMA, UNSPECIFIED ASTHMA SEVERITY, UNSPECIFIED WHETHER PERSISTENT: Primary | ICD-10-CM

## 2021-06-16 NOTE — PROGRESS NOTES
CLINICAL PHARMACY NOTE: MEDS TO BEDS    Total # of Prescriptions Filled: 5   The following medications were delivered to the patient:  · Levaquin 500mg  · Montelukast 10mg  · Prednisone 20mg  · Losartan 25  · Benzonatate 100    Additional Documentation:

## 2021-06-16 NOTE — CARE COORDINATION
BPCI-A Medical Bundle Patient:  Working DR COPD  Admitting Location: [facility]  Adm Date: 21  Date of Discharge: 6/15/21  Bundle End Date: 21      [address] [facility] Initial Follow Up Call    Call within 2 business days of discharge: Yes    Patient: Tejal Hines Patient : 1950   MRN: [id number]    Reason for Admission: COPD   Discharge Date: 6/15/21   RARS: Readmission Risk Score: 13      Last Discharge [facility]       Complaint Diagnosis Description Type Department Provider    21 Shortness of Breath; Cough COPD exacerbation ([facility] [address] . .. ED to Hosp-Admission (Discharged) (ADMITTED) JODI JOSEPH MS Aleshia Garcia MD; Cephus Holter. .. Spoke with: patient who reports feeling better, but she tires herself out - just moved back from [facility] [address] [place] [address] is busy cleaning her moldy air condition unit & installed a hepa filter. Has some SOB with exertion & some wheezing which has improved. Cough improved - tesselon. Clear expectorant. Continues levaquin & prednisone taper. Does not have home oxygen, but plans to purchase a pulse oximeter.   Has appt set up with new [facility] provider on     Explained BPCI followup & added CTN who manages BPCI program to care team.    Non-face-to-face services provided:  Scheduled appointment with PCP-new patient appt scheduled for   Scheduled appointment with Specialist-already saw pulmonary - Dr Maame Medina and reviewed discharge summary and/or continuity of care documents    Care Transitions 24 Hour Call    Schedule Follow Up Appointment with PCP: Completed  Do you have any ongoing symptoms?: Yes  Patient-reported symptoms: Shortness of Breath, Cough (Comment: SOB with exertion & cough improving)  Interventions for patient-reported symptoms: Other (Comment: cleaned AC & HEPA filter + prednisone and levaquin and tesselon)  Do you have a copy of your discharge instructions?: Yes  Do you have all of your prescriptions and are they filled?: Yes  Have you been contacted by a [employer] [address]?: No  Have you scheduled your follow up appointment?: Yes (Comment: )  How are you going to get to your appointment?: Car - family or friend to transport  Were you discharged with any Home Care or Post Acute Services: No  Do you feel like you have everything you need to keep you well at home?: Yes  Care Transitions Interventions           Was this an external facility discharge? No     Challenges to be reviewed by the provider   Additional needs identified to be addressed with provider: No  none             Method of communication with provider : none      Advance Care Planning:   Does patient have an Advance Directive:  reviewed and current. Was this a readmission? No  Patient stated reason for admission: COPD exacerbation  Patients top risk factors for readmission: medical condition-COPD, medication management and multiple health system providers    Care Transition Nurse (CTN) contacted the patient by telephone to perform post hospital discharge assessment. Verified name and  with patient as identifiers. Provided introduction to self, and explanation of the CTN role. CTN reviewed discharge instructions, medical action plan and red flags with patient who verbalized understanding. Patient given an opportunity to ask questions and does not have any further questions or concerns at this time. Were discharge instructions available to patient? Yes. Reviewed appropriate site of care based on symptoms and resources available to patient including: PCP, Specialist and CTN. The patient agrees to contact the PCP office for questions related to their healthcare. Medication reconciliation was performed with patient, who verbalizes understanding of administration of home medications.   Covid Risk Education - Has not had vaccine - negative testing  - she had been advised by pulmonologist in [place] not to get vaccine      Educated patient about risk for severe COVID-19 due to risk factors according to CDC guidelines. CTN reviewed discharge instructions, medical action plan and red flag symptoms with the patient who verbalized understanding. Discussed COVID vaccination status: Yes. Education provided on COVID-19 vaccination as appropriate. Discussed exposure protocols and quarantine with CDC Guidelines. Patient was given an opportunity to verbalize any questions and concerns and agrees to contact CTN or health care provider for questions related to their healthcare. Reviewed and educated patient on any new and changed medications related to discharge diagnosis. Was patient discharged with a pulse oximeter? No - she plans on buying one    CTN provided contact information. Plan for follow-up call in 5-7 days based on severity of symptoms and risk factors.       Follow Up  Future Appointments   Date Time Provider Endy Guillaume   6/18/2021 11:15 AM DIONE Negron - [facility] Olinda Angeles RN

## 2021-06-18 ENCOUNTER — OFFICE VISIT (OUTPATIENT)
Dept: FAMILY MEDICINE CLINIC | Age: 71
End: 2021-06-18
Payer: MEDICARE

## 2021-06-18 VITALS
HEART RATE: 110 BPM | DIASTOLIC BLOOD PRESSURE: 78 MMHG | HEIGHT: 64 IN | TEMPERATURE: 97.6 F | OXYGEN SATURATION: 94 % | BODY MASS INDEX: 17.75 KG/M2 | SYSTOLIC BLOOD PRESSURE: 130 MMHG | WEIGHT: 104 LBS

## 2021-06-18 DIAGNOSIS — Z87.891 FORMER SMOKER: ICD-10-CM

## 2021-06-18 DIAGNOSIS — N18.30 STAGE 3 CHRONIC KIDNEY DISEASE, UNSPECIFIED WHETHER STAGE 3A OR 3B CKD (HCC): ICD-10-CM

## 2021-06-18 DIAGNOSIS — Z00.00 PREVENTATIVE HEALTH CARE: ICD-10-CM

## 2021-06-18 DIAGNOSIS — Z12.31 SCREENING MAMMOGRAM, ENCOUNTER FOR: ICD-10-CM

## 2021-06-18 DIAGNOSIS — J44.1 CHRONIC OBSTRUCTIVE PULMONARY DISEASE WITH ACUTE EXACERBATION (HCC): ICD-10-CM

## 2021-06-18 DIAGNOSIS — I10 ESSENTIAL HYPERTENSION: Primary | ICD-10-CM

## 2021-06-18 DIAGNOSIS — R06.09 DOE (DYSPNEA ON EXERTION): ICD-10-CM

## 2021-06-18 PROCEDURE — G8419 CALC BMI OUT NRM PARAM NOF/U: HCPCS | Performed by: NURSE PRACTITIONER

## 2021-06-18 PROCEDURE — G8926 SPIRO NO PERF OR DOC: HCPCS | Performed by: NURSE PRACTITIONER

## 2021-06-18 PROCEDURE — 4040F PNEUMOC VAC/ADMIN/RCVD: CPT | Performed by: NURSE PRACTITIONER

## 2021-06-18 PROCEDURE — 1111F DSCHRG MED/CURRENT MED MERGE: CPT | Performed by: NURSE PRACTITIONER

## 2021-06-18 PROCEDURE — 99205 OFFICE O/P NEW HI 60 MIN: CPT | Performed by: NURSE PRACTITIONER

## 2021-06-18 PROCEDURE — 1123F ACP DISCUSS/DSCN MKR DOCD: CPT | Performed by: NURSE PRACTITIONER

## 2021-06-18 PROCEDURE — G8427 DOCREV CUR MEDS BY ELIG CLIN: HCPCS | Performed by: NURSE PRACTITIONER

## 2021-06-18 PROCEDURE — G8400 PT W/DXA NO RESULTS DOC: HCPCS | Performed by: NURSE PRACTITIONER

## 2021-06-18 PROCEDURE — 3017F COLORECTAL CA SCREEN DOC REV: CPT | Performed by: NURSE PRACTITIONER

## 2021-06-18 PROCEDURE — 1036F TOBACCO NON-USER: CPT | Performed by: NURSE PRACTITIONER

## 2021-06-18 PROCEDURE — 3023F SPIROM DOC REV: CPT | Performed by: NURSE PRACTITIONER

## 2021-06-18 PROCEDURE — 1090F PRES/ABSN URINE INCON ASSESS: CPT | Performed by: NURSE PRACTITIONER

## 2021-06-18 RX ORDER — ZOSTER VACCINE RECOMBINANT, ADJUVANTED 50 MCG/0.5
0.5 KIT INTRAMUSCULAR SEE ADMIN INSTRUCTIONS
Qty: 0.5 ML | Refills: 1 | Status: CANCELLED | OUTPATIENT
Start: 2021-06-18 | End: 2021-12-15

## 2021-06-18 SDOH — ECONOMIC STABILITY: FOOD INSECURITY: WITHIN THE PAST 12 MONTHS, YOU WORRIED THAT YOUR FOOD WOULD RUN OUT BEFORE YOU GOT MONEY TO BUY MORE.: NEVER TRUE

## 2021-06-18 SDOH — ECONOMIC STABILITY: FOOD INSECURITY: WITHIN THE PAST 12 MONTHS, THE FOOD YOU BOUGHT JUST DIDN'T LAST AND YOU DIDN'T HAVE MONEY TO GET MORE.: NEVER TRUE

## 2021-06-18 ASSESSMENT — ENCOUNTER SYMPTOMS
SHORTNESS OF BREATH: 1
COUGH: 0
ABDOMINAL PAIN: 0
DIARRHEA: 0
ABDOMINAL DISTENTION: 0
RHINORRHEA: 0
SORE THROAT: 0
COLOR CHANGE: 0
CONSTIPATION: 1
NAUSEA: 0
BACK PAIN: 0
CHEST TIGHTNESS: 0

## 2021-06-18 ASSESSMENT — PATIENT HEALTH QUESTIONNAIRE - PHQ9
SUM OF ALL RESPONSES TO PHQ QUESTIONS 1-9: 0
1. LITTLE INTEREST OR PLEASURE IN DOING THINGS: 0
SUM OF ALL RESPONSES TO PHQ QUESTIONS 1-9: 0
2. FEELING DOWN, DEPRESSED OR HOPELESS: 0
SUM OF ALL RESPONSES TO PHQ QUESTIONS 1-9: 0
SUM OF ALL RESPONSES TO PHQ9 QUESTIONS 1 & 2: 0

## 2021-06-18 ASSESSMENT — SOCIAL DETERMINANTS OF HEALTH (SDOH): HOW HARD IS IT FOR YOU TO PAY FOR THE VERY BASICS LIKE FOOD, HOUSING, MEDICAL CARE, AND HEATING?: NOT HARD AT ALL

## 2021-06-18 NOTE — PROGRESS NOTES
Luis Hope, APRN-[address]  [id number]  [address] [address]  Dept: [phone]  Dept Fax: [phone]     Patient ID: Al Schilder is a 69 y.o. female. HPI    Al Schilder is a 69 y.o. female New patient who presents to the office today for a first visit and to establish a relationship with a new primary care provider. Previous PCP: [facility]  last seen: at least 17 months ago    Today, the patient complains of SOB. She feels like yesterday was a good day and then today she is feeling winded more than usual and she isn't sure if it is due to wearing a bra and coming up the stairs. - she  Did find that her wall AC had mold and she thinks that is what caused it, she has cleaned it out and she is noticing she is breathing better. - she has never been on BP medication before, but they started her on cozaar in the hospital. BP is well controlled today. Recent hospitalization:  6/12/2021 for asthma and COPD exacerbation. Preventative care, female:  Last colonoscopy: will get [facility]   Last mammogram: several years  Last PAP: [address] [facility]  Nicotine use: former smoker, quit 2014  Alcohol use: half glass of wine   Drug use: no  Dental exam: while ago 2/2020  Eye exam: before [place] so at least 17 months    Specialists:  Dr. Columba Hargrove    Previous office notes, labs, imaging and hospital records were reviewed prior to and during encounter. The patient's past medical, surgical, social, and family history as well as her current medications and allergies were reviewed as documented in today's encounter Nelson Prakash [address] [place] [address] [place].        Current Outpatient Medications on File Prior to Visit   Medication Sig Dispense Refill    montelukast (SINGULAIR) 10 MG tablet Take 1 tablet by mouth nightly 30 tablet 3    losartan (COZAAR) 25 MG tablet Take 1 tablet by mouth daily 30 tablet 3    benzonatate (TESSALON) 100 MG capsule Take 1 capsule by mouth 3 times daily as needed for Cough 30 capsule 0    levoFLOXacin (LEVAQUIN) 500 MG tablet Take 1 tablet by mouth daily for 7 days 7 tablet 0    predniSONE (DELTASONE) 10 MG tablet Take 3 tablets by mouth daily for 3 days, THEN 2 tablets daily for 3 days, THEN 1 tablet daily for 2 days. 17 tablet 0    budesonide-formoterol (SYMBICORT) 160-4.5 MCG/ACT AERO Inhale 2 puffs into the lungs 2 times daily      albuterol sulfate HFA (VENTOLIN HFA) 108 (90 Base) MCG/ACT inhaler Inhale 2 puffs into the lungs every 4 hours as needed for Wheezing       albuterol (PROVENTIL) (5 MG/ML) 0.5% nebulizer solution Take 2.5 mg by nebulization every 4 hours as needed for Wheezing       No current facility-administered medications on file prior to visit. Subjective:     Review of Systems   Constitutional: Negative for activity change, fatigue and fever. HENT: Negative for congestion, ear pain, rhinorrhea and sore throat. Respiratory: Positive for shortness of breath. Negative for cough and chest tightness. Cardiovascular: Negative for chest pain and palpitations. Gastrointestinal: Positive for constipation (due to steroids). Negative for abdominal distention, abdominal pain, diarrhea and nausea. Endocrine: Negative for polydipsia, polyphagia and polyuria. Genitourinary: Negative for difficulty urinating and dysuria. Musculoskeletal: Negative for arthralgias, back pain and myalgias. Skin: Negative for color change and rash. Neurological: Positive for light-headedness (when washing hair in the bathroom). Negative for dizziness, weakness and headaches. Hematological: Negative for adenopathy. Psychiatric/Behavioral: Negative for agitation and behavioral problems. The patient is not nervous/anxious. Vitals:    06/18/21 1101   BP: 130/78   Pulse: 110   Temp: 97.6 °F (36.4 °C)   SpO2: 94%       Objective:     Physical Exam  Vitals reviewed. Constitutional:       General: She is not in acute distress. Appearance: Normal appearance. HENT:      Head: Normocephalic and atraumatic. Right Ear: External ear normal. A middle ear effusion is present. Left Ear: Hearing, tympanic membrane, ear canal and external ear normal.      Nose: Nose normal.      Mouth/Throat:      Mouth: Mucous membranes are moist.      Pharynx: No oropharyngeal exudate or posterior oropharyngeal erythema. Eyes:      Extraocular Movements: Extraocular movements intact. Conjunctiva/sclera: Conjunctivae normal.      Pupils: Pupils are equal, round, and reactive to light. Cardiovascular:      Rate and Rhythm: Normal rate and regular rhythm. Pulses: Normal pulses. Heart sounds: Normal heart sounds. No murmur heard. Pulmonary:      Effort: Pulmonary effort is normal. No respiratory distress. Breath sounds: Examination of the right-upper field reveals wheezing. Examination of the left-upper field reveals wheezing. Examination of the right-lower field reveals wheezing. Examination of the left-lower field reveals wheezing. Wheezing present. No rales. Abdominal:      General: Bowel sounds are normal. There is no distension. Palpations: Abdomen is soft. Tenderness: There is no abdominal tenderness. Musculoskeletal:         General: Normal range of motion. Cervical back: Normal range of motion. Right lower leg: No edema. Left lower leg: No edema. Lymphadenopathy:      Cervical: No cervical adenopathy. Skin:     General: Skin is warm and dry. Neurological:      General: No focal deficit present. Mental Status: She is alert and oriented to person, place, and time. Deep Tendon Reflexes: Reflexes normal.   Psychiatric:         Mood and Affect: Mood is anxious (fast talker). Behavior: Behavior normal. Behavior is cooperative. Assessment:      Diagnosis Orders   1. Essential hypertension     2.  Stage 3 chronic kidney disease, unspecified whether stage 3a or 3b CKD ([facility] [address]     3. REED (dyspnea on exertion)     4. Preventative health care  CBC    Comprehensive Metabolic Panel    Hemoglobin A1C    Lipid Panel    TSH with Reflex    T4, Free    MISTY DIGITAL SCREEN W OR WO CAD BILATERAL   5. Chronic obstructive pulmonary disease with acute exacerbation ([facility] [address]     6. Former smoker  CT LUNG SCREENING   7. Screening mammogram, encounter for  MISTY DIGITAL SCREEN W OR WO CAD BILATERAL      Plan:     Essential hypertension  - Stable: Medication re-filled as needed, con't medications as prescribed, con't current tx plan  - Continue Cozaar as previously prescribed  - Education provided on maintaining a low sodium diet and routine exercise. - Advised to seek emergent tx if SBP>180 and/or DBP>110, any chest pain, h/a or vision changes    Stage 3 chronic kidney disease, unspecified whether stage 3a or 3b CKD (HCC)  - Stable: Last Cr. 0.75  - Avoid NSAIDs, IV contrast and phosphate containing laxatives. REED (dyspnea on exertion)  Chronic obstructive pulmonary disease with acute exacerbation (HCC)  - Stable: Medication re-filled as needed, con't medications as prescribed, con't current tx plan  - Continue with prednisone and levaqiun as prescribed. - Continue with singulair, symbicort and ventolin as previously prescribed. - Will cont to follow with Dr. Gloria Alatorre as instructed    Preventative health care  - CBC; Future  - Comprehensive Metabolic Panel; Future  - Hemoglobin A1C; Future  - Lipid Panel; Future  - TSH with Reflex; Future  - T4, Free; Future  - MISTY DIGITAL SCREEN W OR WO CAD BILATERAL; Future  Former smoker  - [address]; Future  Screening mammogram, encounter for  - MISTY DIGITAL SCREEN W OR WO CAD BILATERAL; Future  - will request MR from [facility] before ordering colonoscopy or dexa scan. - We did discuss the recommended preventative screening guidelines including routine dental and eye exams.      -

## 2021-06-18 NOTE — PATIENT INSTRUCTIONS
Health Maintenance Recommendations  Exercise   · I generally recommend that people of all ages try to get 150 minutes of physical activity per week and it doesnt matter how this totals up, in other words 30 minutes 5 days per week is as good as 50 minutes 3 days a week and so on. · The level of activity should be such that it is able to get your heart rate up to 100 or more, for example a brisk walk should achieve this rate. Dietary Recommendations  · In terms of diet, I generally recommend trying to eat a healthy well balanced diet full of fruits and vegetables. Avoid carbonated drinks and fruit juices and limit your alcohol use. · Avoid processed foods wherever possible (anything that comes in a can or a box) which can be achieved by sticking to the outside walls of the grocery store where generally you will find fresh fruits/vegetables, meats, dairy, and frozen foods. · Try to avoid starches in the diet where possible and minimize bread, rice, potatoes, and pasta in the diet. Specifically try to avoid gluten, which even in people that dont have a rao allergy, causes havoc in the small intestine and alters absorption of nutrients which can in turn lead to obesity. Sleep  · Try to achieve a regular sleep schedule, waking and laying down at the same time each night. Most people need 7 hours per night plus or minus 2 hours. · You will know that youre getting enough because you will wake feeling refreshed and not need to sleep in to catch up on weekends. Skin Care  · Make sure that you dont neglect your skin. · Play it safe in the sun. Use a sunblock on all of your exposed skin. · The sunblock should be broad spectrum and water resistant. · I do recommend an SPF 30 or higher sun screen any time that you plan to be in the sun for more than 20 minutes, even in the winter or on cloudy days (keep in mind that UV light penetrates clouds and can cause burns even on cloudy days).    · Apply 20 to 30 minutes before going out in the sun. Reapply sunblock every 2 hours and after swimming or sweating. Brandy Matt can also damage your skin on cool, windy days. Clouds and fog do not filter UV light. Make sure you reapply sunblock every 2 hours. · Avoid the sun in the middle of the day, between 10 AM to 4 PM. Your unprotected skin can be damaged in 15 minutes with direct sun exposure. Personal Health  · If you smoke, STOP. There are many resources available to help you successfully quit. · If you are sexually active, always practice safe sex and wear a condom. · See a dentist every 6 months. · See an eye doctor regularly. · Always wear a seat belt while in car. · Get a flu vaccine annually. · Get a tetanus booster vaccine every 10 years. Psychosocial Health  · Finally, make sure that you always have something to look forward to whether this is a vacation, a special event, or just a weekend off work. Having something to look forward to helps to maintain positive focus and is good for mental health.

## 2021-06-23 ENCOUNTER — TELEPHONE (OUTPATIENT)
Dept: ONCOLOGY | Age: 71
End: 2021-06-23

## 2021-06-23 ENCOUNTER — CARE COORDINATION (OUTPATIENT)
Dept: CASE MANAGEMENT | Age: 71
End: 2021-06-23

## 2021-06-23 NOTE — CARE COORDINATION
[facility] Transitions Follow Up Call    2021    Patient: Lennie Villalobos  Patient : 1950   MRN: <[id number]>  Reason for Admission:   Discharge Date: 6/15/21 RARS: Readmission Risk Score: 13         Spoke with: Telma Reyna, patient    Contacted patient for BPCI-A follow up. Ana Garcia stated that she is doing a lot better. Reports she becomes short of breath with exertion especially when carrying her groceries up 12 flights of steps. She is taking rest periods as needed. She stated she had some wheezing and chest tightness when seeing her PCP last week. She stated she was told not to use chemicals which she had been using to clean the mold on the [facility] unit. She reports she is staying away from those chemicals. She continues to use nebulizer. Appetite has been good. She stated she is slacking when it comes to drinking fluids. She is trying to push her fluids. She is aware of when to contact her provider and when to report to the ED. No needs or concerns at this time. Will continue to follow. Care Transitions Subsequent and Final Call    Subsequent and Final Calls  Do you have any ongoing symptoms?: Yes  Patient-reported symptoms: Shortness of Breath  Do you currently have any active services?: No  Do you have any needs or concerns that I can assist you with?: No  Care Transitions Interventions  Other Interventions:            Follow Up  Future Appointments   Date Time Provider Endy Guillaume   2021 10:30 AM [facility] [place] M[facility]MO RM [facility] PB [facility]O [facility] [place]   2021 11:00 AM [facility] [place] CT RM 2 [facility]VZ PB CT [facility]   2021  9:00 AM [facility]   2021  9:30 AM DIONE Barrientos CNP [facility]  Franca Wolf RN

## 2021-06-23 NOTE — TELEPHONE ENCOUNTER
Patient does not meet criteria for lung screening. Patient must have a 20 pack year smoking history. The patient only has a 7.5 pack year. If this is an error please update Epic with the greatest amount smoked then place a new order to reflect the changes. Patient is scheduled but will be canceled if not corrected. Thank you! CT LUNG SCREENING CRITERIA   Patient is between 50-69 years [Medicare] or 46-80 [private insurance]   Is currently smoking or is a former smoker who has quit smoking within the last 15 years   Has at least a 30 pack-year smoking history (regardless of patient being a current or former smoker). Pack history is packs per day times years smoked equals pack history. IE:  1 pack a day X 30 years = 30 pack year history.  Has not had a CT lung screening or any CT chest exam within the last year   Patient is asymptomatic (no signs/symptoms of lung cancer such as shortness of breath, cough, coughing up blood or unexplained significant weight loss).  This patient does not have a condition that limits life expectancy to <5 years   This patient has participated in a shared decision-making session during which potential risks and benefits of CT Lung Screening were discussed.  This patient was informed of the importance of adherence to annual screening, impact of comorbidities, and ability/willingness to undergo diagnosis and treatment.  This patient was informed of the importance of smoking cessation and/or maintaining smoking abstinence. If applicable, this patient was offered information on smoking cessation counseling services. ORDER MUST INCLUDE: THIS INFORMATION IS AUTOMATICALLY ON ORDER IF ENTERED IN EPIC   PROVIDER'S NPI - AUTOMATICALLY APPEARS ON ORDER WHEN SIGNED BY THE PROVIDER.     PACK HISTORY    QUIT DATE IF THEY HAVE QUIT   DIAGNOSIS: Z87.891 PERSONAL HISTORY OF TABACCO USE OR PERSONAL HISTORY OF NICOTINE DEPENDENCE. (BOTH HAS THE SAME Z CODE) THIS IS REQUIRED FOR MEDICARE/MEDICAID.  FOR PRIVATE INSURANCES DIAGNOSIS CODE CAN BE: Z12.2 ENCOUNTER FOR SCREENING FOR MALIGNANT NEOPLASM OF RESPIRATORY ORGANS OR Z87.891 PERSONAL HISTORY OF TABACCO USE OR PERSONAL HISTORY OF NICOTINE DEPENDENCE.

## 2021-06-28 ENCOUNTER — TELEPHONE (OUTPATIENT)
Dept: ONCOLOGY | Age: 71
End: 2021-06-28

## 2021-06-28 DIAGNOSIS — Z87.891 PERSONAL HISTORY OF NICOTINE DEPENDENCE: Primary | ICD-10-CM

## 2021-06-28 NOTE — TELEPHONE ENCOUNTER
Order received for ct lung screening does not contain all CMS required data. Writer spoke to pt and got updated smoking history. New Pending order placed to include required data. Please sign pended order as the patient is scheduled for 6/30/21.   Thank you

## 2021-06-28 NOTE — LETTER
6/28/2021        Lizy Mcdowell  [facility], [place], [address] [facility]  [address] [facility]    Dear Lizy Mcdowell: Your healthcare provider has ordered a low dose CT scan of the chest for lung cancer screening. You will find enclosed, information about CT lung screening. Please review the statement of understanding, you will be asked to sign a copy of this at the time of your CT scan    If you have not already been contacted to make the appointment for your scan, please call our scheduling department at [phone]    Keep in mind that CT lung screening does not take the place of smoking cessation. If you are a current smoker, you will find enclosed smoking cessation resources. Please do not hesitate to contact me if you have any questions or concerns.     [address],      [address] [facility] Lung Screening Program  [phone]

## 2021-06-30 ENCOUNTER — HOSPITAL ENCOUNTER (OUTPATIENT)
Dept: MAMMOGRAPHY | Age: 71
Discharge: HOME | End: 2021-07-02
Payer: MEDICARE

## 2021-06-30 ENCOUNTER — HOSPITAL ENCOUNTER (OUTPATIENT)
Dept: CT IMAGING | Age: 71
Discharge: HOME | End: 2021-07-02
Payer: MEDICARE

## 2021-06-30 DIAGNOSIS — Z87.891 FORMER SMOKER: ICD-10-CM

## 2021-06-30 DIAGNOSIS — Z12.31 SCREENING MAMMOGRAM, ENCOUNTER FOR: ICD-10-CM

## 2021-06-30 DIAGNOSIS — Z00.00 PREVENTATIVE HEALTH CARE: ICD-10-CM

## 2021-06-30 PROCEDURE — 77063 BREAST TOMOSYNTHESIS BI: CPT

## 2021-06-30 PROCEDURE — 71271 CT THORAX LUNG CANCER SCR C-: CPT

## 2021-07-01 ENCOUNTER — CARE COORDINATION (OUTPATIENT)
Dept: CASE MANAGEMENT | Age: 71
End: 2021-07-01

## 2021-07-01 NOTE — CARE COORDINATION
Chad 45 Transitions Follow Up Call    2021    Patient: Amrit Burton  Patient : 1950   MRN: [id number]  Reason for Admission:   Discharge Date: 6/15/21 RARS: Readmission Risk Score: 13         Spoke with: Melissa Landry, patient    Contacted patient for BPCI-A follow up. Delfino Reis stated that she is doing fine. Reports she saw her pulmonologist the other day. She continues to have a cough and \"some\" congestion. She stated her pulmonologist is aware. Cough is d/t allergies. She reports having \"some\" chest pressure/discomfort yesterday but stated she had a mammogram which was the cause of it. She denies having any c/o chest pain/discomfort, pressure, tightness today. Breathing is at baseline. She is eating but may not always drink as much water as she should be. Her fluid intake is better on some days than others. Delfino Reis asked if CTN had any recommendations on which cleaning products she should use. She stated she is very sensitive to chemicals and was told by her provider to avoid using any. CTN informed her that she could try a product that is all natural but recommended she ask her pulmonologist because they may have recommendations on which products she could try. She confirmed she is still using her breathing treatments. She is aware of when to contact her provider. No needs or concerns at this time. Will continue to follow. Care Transitions Subsequent and Final Call    Subsequent and Final Calls  Do you have any ongoing symptoms?: Yes  Patient-reported symptoms: Shortness of Breath, Cough, Congestion  Have your medications changed?: No  Do you have any questions related to your medications?: No  Do you currently have any active services?: No  Do you have any needs or concerns that I can assist you with?: No  Identified Barriers: None  Care Transitions Interventions  Other Interventions:            Follow Up  Future Appointments   Date Time Provider Endy Guillaume 7/19/2021  9:00 AM DIONE Anthony CNPURG  [facility]   9/21/2021  9:30 AM DIONE Anthony CNP  Jim Hobbs RN

## 2021-07-09 ENCOUNTER — CARE COORDINATION (OUTPATIENT)
Dept: CASE MANAGEMENT | Age: 71
End: 2021-07-09

## 2021-07-09 NOTE — CARE COORDINATION
Chad 45 Transitions Follow Up Call    2021    Patient: Celi Oliver  Patient : 1950   MRN: [id number]  Reason for Admission:   Discharge Date: 6/15/21 RARS: Readmission Risk Score: 13    Received return phone call from patient. Adamaris Hill left a message stating that she is still doing good. She is appreciative of call. She stated \"thanks for checking on my well being. We'll chat the next time. \"        Follow Up  Future Appointments   Date Time Provider Endy Guillaume   2021  9:00 AM DIONE Archibald CNP [facility]   2021  9:30 AM DIONE Archibald CNPURG BOBBY Brooks RN

## 2021-07-09 NOTE — CARE COORDINATION
[facility] Transitions Follow Up Call    2021    Patient: Pushpa Karimi  Patient : 1950   MRN: [id number]  Reason for Admission:   Discharge Date: 6/15/21 RARS: Readmission Risk Score: 13    Attempted to contact patient for BPCI-A follow up. Unable to reach patient. Left message with contact information and request for call back.       Follow Up  Future Appointments   Date Time Provider Endy Guillaume   2021  9:00 AM [facility]   2021  9:30 AM DIONE Tavares CNP, RN

## 2021-07-12 ASSESSMENT — LIFESTYLE VARIABLES
HOW OFTEN DURING THE LAST YEAR HAVE YOU FAILED TO DO WHAT WAS NORMALLY EXPECTED FROM YOU BECAUSE OF DRINKING: 0
AUDIT TOTAL SCORE: 3
HOW OFTEN DURING THE LAST YEAR HAVE YOU FOUND THAT YOU WERE NOT ABLE TO STOP DRINKING ONCE YOU HAD STARTED: 0
HOW MANY STANDARD DRINKS CONTAINING ALCOHOL DO YOU HAVE ON A TYPICAL DAY: ONE OR TWO
HOW OFTEN DURING THE LAST YEAR HAVE YOU NEEDED AN ALCOHOLIC DRINK FIRST THING IN THE MORNING TO GET YOURSELF GOING AFTER A NIGHT OF HEAVY DRINKING: 0
AUDIT-C TOTAL SCORE: 0
HOW OFTEN DURING THE LAST YEAR HAVE YOU HAD A FEELING OF GUILT OR REMORSE AFTER DRINKING: 0
HOW OFTEN DURING THE LAST YEAR HAVE YOU BEEN UNABLE TO REMEMBER WHAT HAPPENED THE NIGHT BEFORE BECAUSE YOU HAD BEEN DRINKING: NEVER
HOW OFTEN DURING THE LAST YEAR HAVE YOU NEEDED AN ALCOHOLIC DRINK FIRST THING IN THE MORNING TO GET YOURSELF GOING AFTER A NIGHT OF HEAVY DRINKING: NEVER
HAVE YOU OR SOMEONE ELSE BEEN INJURED AS A RESULT OF YOUR DRINKING: YES, BUT NOT IN THE PAST YEAR
HOW OFTEN DO YOU HAVE A DRINK CONTAINING ALCOHOL: 1
HAS A RELATIVE, FRIEND, DOCTOR, OR ANOTHER HEALTH PROFESSIONAL EXPRESSED CONCERN ABOUT YOUR DRINKING OR SUGGESTED YOU CUT DOWN: 0
HOW OFTEN DURING THE LAST YEAR HAVE YOU FOUND THAT YOU WERE NOT ABLE TO STOP DRINKING ONCE YOU HAD STARTED: NEVER
HAS A RELATIVE, FRIEND, DOCTOR, OR ANOTHER HEALTH PROFESSIONAL EXPRESSED CONCERN ABOUT YOUR DRINKING OR SUGGESTED YOU CUT DOWN: NO
HAVE YOU OR SOMEONE ELSE BEEN INJURED AS A RESULT OF YOUR DRINKING: 2
HOW OFTEN DO YOU HAVE SIX OR MORE DRINKS ON ONE OCCASION: 0
HOW OFTEN DURING THE LAST YEAR HAVE YOU HAD A FEELING OF GUILT OR REMORSE AFTER DRINKING: NEVER
AUDIT-C TOTAL SCORE: 1
HOW OFTEN DURING THE LAST YEAR HAVE YOU BEEN UNABLE TO REMEMBER WHAT HAPPENED THE NIGHT BEFORE BECAUSE YOU HAD BEEN DRINKING: 0
HOW OFTEN DURING THE LAST YEAR HAVE YOU FAILED TO DO WHAT WAS NORMALLY EXPECTED FROM YOU BECAUSE OF DRINKING: NEVER
HOW MANY STANDARD DRINKS CONTAINING ALCOHOL DO YOU HAVE ON A TYPICAL DAY: 0
HOW OFTEN DO YOU HAVE SIX OR MORE DRINKS ON ONE OCCASION: NEVER
HOW OFTEN DO YOU HAVE A DRINK CONTAINING ALCOHOL: MONTHLY OR LESS
AUDIT TOTAL SCORE: 0

## 2021-07-12 ASSESSMENT — PATIENT HEALTH QUESTIONNAIRE - PHQ9
1. LITTLE INTEREST OR PLEASURE IN DOING THINGS: 0
2. FEELING DOWN, DEPRESSED OR HOPELESS: 0
SUM OF ALL RESPONSES TO PHQ9 QUESTIONS 1 & 2: 0
SUM OF ALL RESPONSES TO PHQ QUESTIONS 1-9: 0

## 2021-07-19 ENCOUNTER — OFFICE VISIT (OUTPATIENT)
Dept: FAMILY MEDICINE CLINIC | Age: 71
End: 2021-07-19
Payer: MEDICARE

## 2021-07-19 VITALS
HEIGHT: 64 IN | RESPIRATION RATE: 14 BRPM | DIASTOLIC BLOOD PRESSURE: 87 MMHG | BODY MASS INDEX: 18.08 KG/M2 | TEMPERATURE: 97.9 F | WEIGHT: 105.9 LBS | OXYGEN SATURATION: 97 % | HEART RATE: 107 BPM | SYSTOLIC BLOOD PRESSURE: 139 MMHG

## 2021-07-19 DIAGNOSIS — J01.90 ACUTE BACTERIAL RHINOSINUSITIS: ICD-10-CM

## 2021-07-19 DIAGNOSIS — J44.1 CHRONIC OBSTRUCTIVE PULMONARY DISEASE WITH ACUTE EXACERBATION (HCC): ICD-10-CM

## 2021-07-19 DIAGNOSIS — R05.9 COUGH: ICD-10-CM

## 2021-07-19 DIAGNOSIS — H65.191 ACUTE MEE (MIDDLE EAR EFFUSION), RIGHT: ICD-10-CM

## 2021-07-19 DIAGNOSIS — B96.89 ACUTE BACTERIAL RHINOSINUSITIS: ICD-10-CM

## 2021-07-19 DIAGNOSIS — Z00.00 ROUTINE GENERAL MEDICAL EXAMINATION AT A HEALTH CARE FACILITY: Primary | ICD-10-CM

## 2021-07-19 DIAGNOSIS — M79.89 SWELLING OF LEFT MIDDLE FINGER: ICD-10-CM

## 2021-07-19 PROCEDURE — G0439 PPPS, SUBSEQ VISIT: HCPCS | Performed by: NURSE PRACTITIONER

## 2021-07-19 PROCEDURE — 1123F ACP DISCUSS/DSCN MKR DOCD: CPT | Performed by: NURSE PRACTITIONER

## 2021-07-19 PROCEDURE — 3017F COLORECTAL CA SCREEN DOC REV: CPT | Performed by: NURSE PRACTITIONER

## 2021-07-19 PROCEDURE — 4040F PNEUMOC VAC/ADMIN/RCVD: CPT | Performed by: NURSE PRACTITIONER

## 2021-07-19 RX ORDER — AZITHROMYCIN 250 MG/1
250 TABLET, FILM COATED ORAL SEE ADMIN INSTRUCTIONS
Qty: 6 TABLET | Refills: 0 | Status: SHIPPED | OUTPATIENT
Start: 2021-07-19 | End: 2021-07-24

## 2021-07-19 RX ORDER — MONTELUKAST SODIUM 10 MG/1
10 TABLET ORAL NIGHTLY
Qty: 30 TABLET | Refills: 3 | Status: SHIPPED | OUTPATIENT
Start: 2021-07-19 | End: 2021-09-21 | Stop reason: SDUPTHER

## 2021-07-19 RX ORDER — ALBUTEROL SULFATE 2.5 MG/3ML
2.5 SOLUTION RESPIRATORY (INHALATION) EVERY 6 HOURS PRN
Qty: 120 EACH | Refills: 3 | Status: SHIPPED | OUTPATIENT
Start: 2021-07-19 | End: 2021-09-21

## 2021-07-19 NOTE — PROGRESS NOTES
Medicare Annual Wellness Visit  Name: Jose Rocha Date: 2021   MRN: [id number] Sex: Female   Age: 69 y.o. Ethnicity: Non-/Non    : 1950 Race: Alessandro Altamirano is here for Medicare AWV    Screenings for behavioral, psychosocial and functional/safety risks, and cognitive dysfunction are all negative except as indicated below. These results, as well as other patient data from the [address] [facility] [address] [facility], are documented in Flowsheets linked to this Encounter. Allergies   Allergen Reactions    Oxycodone-Acetaminophen      Other reaction(s): Unknown    Penicillin G Sodium Rash    Penicillins Rash       Prior to Visit Medications    Medication Sig Taking?  Authorizing Provider   montelukast (SINGULAIR) 10 MG tablet Take 1 tablet by mouth nightly Yes DIONE Pereira CNP   losartan (COZAAR) 25 MG tablet Take 1 tablet by mouth daily Yes DIONE Pereira CNP   budesonide-formoterol (SYMBICORT) 160-4.5 MCG/ACT AERO Inhale 2 puffs into the lungs 2 times daily Yes Historical Provider, MD   albuterol sulfate HFA (VENTOLIN HFA) 108 (90 Base) MCG/ACT inhaler Inhale 2 puffs into the lungs every 4 hours as needed for Wheezing  Yes Historical Provider, MD   albuterol (PROVENTIL) (5 MG/ML) 0.5% nebulizer solution Take 2.5 mg by nebulization every 4 hours as needed for Wheezing Yes Historical Provider, MD       Past Medical History:   Diagnosis Date    Asthma     COPD (chronic obstructive pulmonary disease) ([facility] [address]     Dyspnea     Fibromyalgia     Herniated disc, cervical     c 6-7    Herniated lumbar intervertebral disc     L3-4-4    Kidney disease        Past Surgical History:   Procedure Laterality Date    DENTAL SURGERY      HYSTERECTOMY         Family History   Problem Relation Age of Onset    Diabetes Mother     Heart Disease Father     Breast Cancer Maternal Aunt        CareTeam (Including outside providers/suppliers regularly involved in providing care):   Patient Care Team:  DIONE Kim CNP as PCP - General (Certified Nurse Practitioner)  [facility]  Eulalio Bhakta RN as Care Transitions Nurse  Arvind Joy MD as Consulting Physician (Pulmonology)  René Carbone MD as Consulting Physician (Nephrology)    Wt Readings from Last 3 Encounters:   07/19/21 105 lb 14.4 oz (48 kg)   06/18/21 104 lb (47.2 kg)   06/15/21 110 lb 10.7 oz (50.2 kg)     Vitals:    07/19/21 0850   BP: 139/87   Pulse: 107   Resp: 14   Temp: 97.9 °F (36.6 °C)   TempSrc: Temporal   SpO2: 97%   Weight: 105 lb 14.4 oz (48 kg)   Height: 5' 3.5\" (1.613 m)     Body mass index is 18.47 kg/m². Based upon direct observation of the patient, evaluation of cognition reveals recent and remote memory intact. ENT: left tympanic membrane normal, bilateral external ear normal, bilateral ear canal normal, left tympanic membrane abnormal- fluid and erythematous, nasal mucosa congested and erythematous, sinus tenderness noted- frontal and maxillary and oropharynx erythematous with exudate  Pulmonary/Chest: no chest wall tenderness and wheezing present- left lower lobe, clear  Cardiovascular: normal rate, normal S1 and S2, no gallops, intact distal pulses and no carotid bruits  Left middle finger knuckle swollen and erythema noted. Patient's complete Health Risk Assessment and screening values have been reviewed and are found in Flowsheets. The following problems were reviewed today and where indicated follow up appointments were made and/or referrals ordered. Positive Risk Factor Screenings with Interventions:          General Health and ACP:  General  In general, how would you say your health is?: Fair  In the past 7 days, have you experienced any of the following?  New or Increased Pain, New or Increased Fatigue, Loneliness, Social Isolation, Stress or Anger?: None of These  Do you get the social and emotional support that you need?: Yes  Do you have a Living Will?: Yes  Advance Directives     Power of [address] Will ACP-Advance Directive ACP-Power of     Not on File Not on File Not on File Not on File      General Health Risk Interventions:  · no concerns at this time    Health Habits/Nutrition:  Health Habits/Nutrition  Do you exercise for at least 20 minutes 2-3 times per week?: (!) No  Have you lost any weight without trying in the past 3 months?: No  Do you eat only one meal per day?: No  Have you seen the dentist within the past year?: (!) No  Body mass index: (!) 18.46  Health Habits/Nutrition Interventions:  · Inadequate physical activity:  patient is not ready to increase his/her physical activity level at this time, she is trying to do more around the house and increase walking once weather gets better     Safety:  Safety  Do you have working smoke detectors?: Yes  Have all throw rugs been removed or fastened?: Yes  Do you have non-slip mats or surfaces in all bathtubs/showers?: (!) No  Do all of your stairways have a railing or banister?: Yes  Are your doorways, halls and stairs free of clutter?: Yes  Do you always fasten your seatbelt when you are in a car?: Yes  Safety Interventions:  · Home safety tips provided     Personalized Preventive Plan   Current Health Maintenance Status    There is no immunization history on file for this patient.      Health Maintenance   Topic Date Due    Lipid screen  06/18/2022 (Originally 8/26/1989)    DTaP/Tdap/Td vaccine (1 - Tdap) 06/18/2022 (Originally 8/26/1968)    Shingles Vaccine (1 of 2) 06/18/2022 (Originally 8/26/1999)    COVID-19 Vaccine (1) 06/18/2022 (Originally 8/26/1961)    Hepatitis C screen  06/18/2022 (Originally 1950)    Pneumococcal 65+ years Vaccine (1 of 1 - PPSV23) 06/22/2022 (Originally 8/26/2014)    Flu vaccine (1) 09/01/2021    Potassium monitoring  06/15/2022    Creatinine monitoring  06/15/2022    Low dose CT lung screening  06/30/2022    Breast cancer screen  06/30/2023    Colon cancer screen colonoscopy  10/03/2026    DEXA (modify frequency per FRAX score)  Completed    Hepatitis A vaccine  Aged Out    Hepatitis B vaccine  Aged Out    Hib vaccine  Aged Out    Meningococcal (ACWY) vaccine  Aged Out     Recommendations for [employer] Due: see orders and patient instructions/AVS.  . Recommended screening schedule for the next 5-10 years is provided to the patient in written form: see Patient Louisa Castro was seen today for medicare awv. Diagnoses and all orders for this visit:    Routine general medical examination at a [facility] facility    Chronic obstructive pulmonary disease with acute exacerbation (HCC)  -     montelukast (SINGULAIR) 10 MG tablet; Take 1 tablet by mouth nightly  -     albuterol (PROVENTIL) (5 MG/ML) 0.5% nebulizer solution; Take 0.5 mLs by nebulization every 6 hours as needed for Wheezing or Shortness of Breath    Cough  Acute bacterial rhinosinusitis  Acute KEILY (middle ear effusion), right  -     azithromycin (ZITHROMAX) 250 MG tablet; Take 1 tablet by mouth See Admin Instructions for 5 days 500mg on day 1 followed by 250mg on days 2 - 5    Swelling of left middle finger  -     Uric Acid;  Future

## 2021-07-19 NOTE — PATIENT INSTRUCTIONS
Personalized Preventive Plan for Malu Srivastava - 7/19/2021  Medicare offers a range of preventive health benefits. Some of the tests and screenings are paid in full while other may be subject to a deductible, co-insurance, and/or copay. Some of these benefits include a comprehensive review of your medical history including lifestyle, illnesses that may run in your family, and various assessments and screenings as appropriate. After reviewing your medical record and screening and assessments performed today your provider may have ordered immunizations, labs, imaging, and/or referrals for you. A list of these orders (if applicable) as well as your Preventive Care list are included within your After Visit Summary for your review. Other Preventive Recommendations:    · A preventive eye exam performed by an eye specialist is recommended every 1-2 years to screen for glaucoma; cataracts, macular degeneration, and other eye disorders. · A preventive dental visit is recommended every 6 months. · Try to get at least 150 minutes of exercise per week or 10,000 steps per day on a pedometer . · Order or download the FREE \"Exercise & Physical Activity: Your Everyday Guide\" from [employer]. Call 5-949.768.7301 or search [employer] online. · You need 2804-3509 mg of calcium and 7020-4240 IU of vitamin D per day. It is possible to meet your calcium requirement with diet alone, but a vitamin D supplement is usually necessary to meet this goal.  · When exposed to the sun, use a sunscreen that protects against both UVA and UVB radiation with an SPF of 30 or greater. Reapply every 2 to 3 hours or after sweating, drying off with a towel, or swimming. · Always wear a seat belt when traveling in a car. Always wear a helmet when riding a bicycle or motorcycle.

## 2021-07-20 ENCOUNTER — CARE COORDINATION (OUTPATIENT)
Dept: CASE MANAGEMENT | Age: 71
End: 2021-07-20

## 2021-07-20 NOTE — CARE COORDINATION
[facility] Transitions Follow Up Call    2021    Patient: Yadira Degroot  Patient : 1950   MRN: [id number]  Reason for Admission:   Discharge Date: 6/15/21 RARS: Readmission Risk Score: 13         Spoke with: Daniella Cloudkaren, patient    Contacted patient for BPCI-A follow up. Stated she saw her PCP yesterday for a wellness visit but was started on Abx. She reports having cough and congestion. She stated her breathing is not where she'd like it to be. Becomes short of breath with exertion which worsened within the last 4-5 days. No distress. No c/o chest pain/discomfort, pressure, tightness today but stated she had some tightness yesterday at her appointment. She will continue to monitor symptoms and aware of when to contact her doctor. No needs or concerns at this time. Will continue to follow. Care Transitions Subsequent and Final Call    Subsequent and Final Calls  Do you have any ongoing symptoms?: Yes  Patient-reported symptoms: Cough, Congestion  Have your medications changed?: Yes  Patient Reports: Started on Abx  Do you have any questions related to your medications?: No  Do you currently have any active services?: No  Do you have any needs or concerns that I can assist you with?: No  Identified Barriers: None  Care Transitions Interventions  Other Interventions:            Follow Up  Future Appointments   Date Time Provider Endy Guillaume   2021  9:30 AM Lenwood Merlin, APRN - [facility] Cecilio Roca RN

## 2021-07-27 ENCOUNTER — CARE COORDINATION (OUTPATIENT)
Dept: CASE MANAGEMENT | Age: 71
End: 2021-07-27

## 2021-07-27 NOTE — CARE COORDINATION
[facility] Transitions Follow Up Call    2021    Patient: Gopal Marsh  Patient : 1950   MRN: [id number]  Reason for Admission:   Discharge Date: 6/15/21 RARS: Readmission Risk Score: 13    Attempted to contact patient for BPCI-A follow up. Unable to reach patient. Left message with contact information and request for call back.      Follow Up  Future Appointments   Date Time Provider Endy Guillaume   2021  9:30 AM DIONE Prescott - [facility] Sri Llamas RN

## 2021-07-27 NOTE — CARE COORDINATION
[facility] Transitions Follow Up Call    2021    Patient: Gopal Marsh  Patient : 1950   MRN: [id number]  Reason for Admission:   Discharge Date: 6/15/21 RARS: Readmission Risk Score: 13    Received return phone call from patient. Left message stating she is feeling \"a little better\" than when we last chatted. She stated \"coughing is what does me in\". She can start cough ing for no reason. She stated other than coughing, she is still status quo. She thanked CTN for call. Will continue to follow.     Follow Up  Future Appointments   Date Time Provider Endy Guillaume   2021  9:30 AM DIONE Prescott - [facility] BOBBY Llamas RN

## 2021-08-03 ENCOUNTER — CARE COORDINATION (OUTPATIENT)
Dept: CASE MANAGEMENT | Age: 71
End: 2021-08-03

## 2021-08-03 RX ORDER — BENZONATATE 100 MG/1
100 CAPSULE ORAL 3 TIMES DAILY PRN
Qty: 30 CAPSULE | Refills: 0 | Status: SHIPPED | OUTPATIENT
Start: 2021-08-03 | End: 2021-08-13

## 2021-08-03 NOTE — CARE COORDINATION
[facility] Transitions Follow Up Call    8/3/2021    Patient: Kaylen Gross  Patient : 1950   MRN: [id number]  Reason for Admission:   Discharge Date: 6/15/21 RARS: Readmission Risk Score: 13         Spoke with:  Ashutosh Gayle, patient    Contacted patient for BPCI-A follow up. Kadie Randolph reports she has good days and bad days. Continues to have a productive cough. Report sputum is clear. Stated she is coughing \"a lot\" and making it difficult for her to sleep. Taking Robitussin DM which has not been effective. She was asking about the Perles that she received when discharged from the hospital or anything for congestion. CTN will send a message to PCP. She continues to use her inhaler. Denies having any c/o chest pain/discomfort, pressure, tightness. Stated she may become short of breath when she is coughing constantly. Kadie Randolph reports she received her PNA and first COVID vaccine today. She is aware of when to contact her provider. No other needs or concerns at this time. Will continue to follow. Care Transitions Subsequent and Final Call    Subsequent and Final Calls  Do you have any ongoing symptoms?: Yes  Patient-reported symptoms: Cough  Do you currently have any active services?: No  Do you have any needs or concerns that I can assist you with?: Yes  Patient-reported Needs or Concerns: Would like CTN follow up on Tessalon Perles  Care Transitions Interventions  Other Interventions:            Follow Up  Future Appointments   Date Time Provider Endy Guillaume   2021  9:30 AM DIONE George - CNP [facility] Radha Scruggs RN

## 2021-08-09 ENCOUNTER — CARE COORDINATION (OUTPATIENT)
Dept: CASE MANAGEMENT | Age: 71
End: 2021-08-09

## 2021-08-09 NOTE — CARE COORDINATION
[facility] Transitions Follow Up Call    2021    Patient: Gretchen Gaucher  Patient : 1950   MRN: [id number]  Reason for Admission:   Discharge Date: 6/15/21 RARS: Readmission Risk Score: 13    Attempted to contact patient for BPCI-A follow up. Unable to reach patient. Left message with contact information and request for call back.         Follow Up  Future Appointments   Date Time Provider Endy Guillaume   2021  9:30 AM DIONE Pelayo - [facility]  Bryanna Loyola RN

## 2021-08-09 NOTE — CARE COORDINATION
[facility] Transitions Follow Up Call    2021    Patient: Yadira Degroot  Patient : 1950   MRN: [id number]  Reason for Admission:   Discharge Date: 6/15/21 RARS: Readmission Risk Score: 13    Received return phone call from patient. Left message stating that she is still doing okay. No new or worsening symptoms. Thanked CTN for calling. No needs or concerns at this time.         Follow Up  Future Appointments   Date Time Provider Endy Guillaume   2021  9:30 AM Lenwood Merlin, APRN - [facility]  Cecilio Roca RN

## 2021-08-17 ENCOUNTER — CARE COORDINATION (OUTPATIENT)
Dept: CASE MANAGEMENT | Age: 71
End: 2021-08-17

## 2021-08-17 NOTE — CARE COORDINATION
[facility] Transitions Follow Up Call    2021    Patient: Aparna Porter  Patient : 1950   MRN: [id number]  Reason for Admission:   Discharge Date: 6/15/21 RARS: Readmission Risk Score: 13         Spoke with: Guillermina Morales, patient    Contacted patient for BPCI-A follow up. Sabina Hatchet stated that she was doing fairly well up until today. She stated she was hardly coughing the last couple of days. Started coughing at 4 am this morning. Unsure of what triggered the cough. Was spraying  yesterday but wore a mask when using . She did not  the [facility] because Medicare would not cover it. Medication would cost $54.00 out of pocket. Reports she bought a pulse oximeter. O2 sat 95-99%. She reports O2 sats dropped down to 90% with activity. Recovery time usually 1 minute but the last time it took 1 minute 30 seconds. She noticed when taking deep long breathes, O2 sat increased. Encouraged to continue to do deep breathing exercises and to use IS. She is aware of when to contact her provider with any new or worsening symptoms as well as when to call 911 and/or report to the ER. No needs at this time. Will continue to follow. Care Transitions Subsequent and Final Call    Subsequent and Final Calls  Do you have any ongoing symptoms?: Yes  Patient-reported symptoms: Cough, Shortness of Breath  Have your medications changed?: No  Do you have any questions related to your medications?: No  Do you currently have any active services?: No  Do you have any needs or concerns that I can assist you with?: No  Care Transitions Interventions  Other Interventions:            Follow Up  Future Appointments   Date Time Provider Endy Guillaume   2021  9:30 AM DIONE Ordaz - [facility]  Beverly Sanchez RN

## 2021-08-18 ENCOUNTER — CARE COORDINATION (OUTPATIENT)
Dept: CASE MANAGEMENT | Age: 71
End: 2021-08-18

## 2021-08-18 NOTE — CARE COORDINATION
[facility] Transitions Follow Up Call    2021    Patient: Rose Bloom  Patient : 1950   MRN: [id number]  Reason for Admission:   Discharge Date: 6/15/21 RARS: Readmission Risk Score: 13         Spoke with: Alma Henderson, patient    Received call yesterday from patient asking how to clean her incentive spirometer. CTN called patient back. Leena Cameron stated she is feeling \"excellent\" today. Started using her incentive spirometer every hour and feeling better. Informed her that she can use warm water and mild soap, air dry completely. She verbalized understanding. No other questions or concerns at this time. Will continue to follow.       Follow Up  Future Appointments   Date Time Provider Endy Guillaume   2021  9:30 AM DIONE Segal - CNP [facility] Patience العلي RN

## 2021-08-25 ENCOUNTER — TELEPHONE (OUTPATIENT)
Dept: FAMILY MEDICINE CLINIC | Age: 71
End: 2021-08-25

## 2021-08-25 NOTE — TELEPHONE ENCOUNTER
----- Message from Andrzej Cervantes sent at 8/25/2021  1:29 PM EDT -----  Subject: Message to Provider    QUESTIONS  Information for Provider? Patient called in to let office know that that   her AWV was coded incorrectly under new patient annual physical and needs   to be corrected to AWV for her medical coverage to cover the visit.   ---------------------------------------------------------------------------  --------------  [address]  What is the best way for the office to contact you? OK to leave message on   voicemail  Preferred Call Back Phone Number? [phone]  ---------------------------------------------------------------------------  --------------  SCRIPT ANSWERS  Relationship to Patient?  Self No lesions; no rash

## 2021-08-27 ENCOUNTER — CARE COORDINATION (OUTPATIENT)
Dept: CASE MANAGEMENT | Age: 71
End: 2021-08-27

## 2021-09-05 ENCOUNTER — HOSPITAL ENCOUNTER (OUTPATIENT)
Age: 71
Discharge: HOME | End: 2021-09-05
Payer: MEDICARE

## 2021-09-05 DIAGNOSIS — M79.89 SWELLING OF LEFT MIDDLE FINGER: ICD-10-CM

## 2021-09-05 DIAGNOSIS — Z00.00 PREVENTATIVE HEALTH CARE: ICD-10-CM

## 2021-09-05 LAB
ALBUMIN SERPL-MCNC: 4.1 G/DL (ref 3.5–5.2)
ALBUMIN/GLOBULIN RATIO: 1.6 (ref 1–2.5)
ALP BLD-CCNC: 70 U/L (ref 35–104)
ALT SERPL-CCNC: 20 U/L (ref 5–33)
ANION GAP SERPL CALCULATED.3IONS-SCNC: 12 MMOL/L (ref 9–17)
AST SERPL-CCNC: 25 U/L
BILIRUB SERPL-MCNC: 0.35 MG/DL (ref 0.3–1.2)
BUN BLDV-MCNC: 21 MG/DL (ref 8–23)
BUN/CREAT BLD: ABNORMAL (ref 9–20)
CALCIUM SERPL-MCNC: 9.1 MG/DL (ref 8.6–10.4)
CHLORIDE BLD-SCNC: 107 MMOL/L (ref 98–107)
CHOLESTEROL/HDL RATIO: 3.9
CHOLESTEROL: 169 MG/DL
CO2: 21 MMOL/L (ref 20–31)
CREAT SERPL-MCNC: 0.98 MG/DL (ref 0.5–0.9)
GFR AFRICAN AMERICAN: >60 ML/MIN
GFR NON-AFRICAN AMERICAN: 56 ML/MIN
GFR SERPL CREATININE-BSD FRML MDRD: ABNORMAL ML/MIN/{1.73_M2}
GFR SERPL CREATININE-BSD FRML MDRD: ABNORMAL ML/MIN/{1.73_M2}
GLUCOSE BLD-MCNC: 103 MG/DL (ref 70–99)
HCT VFR BLD CALC: 46.5 % (ref 36.3–47.1)
HDLC SERPL-MCNC: 43 MG/DL
HEMOGLOBIN: 13.7 G/DL (ref 11.9–15.1)
LDL CHOLESTEROL: 108 MG/DL (ref 0–130)
MCH RBC QN AUTO: 32.1 PG (ref 25.2–33.5)
MCHC RBC AUTO-ENTMCNC: 29.5 G/DL (ref 28.4–34.8)
MCV RBC AUTO: 108.9 FL (ref 82.6–102.9)
NRBC AUTOMATED: 0 PER 100 WBC
PDW BLD-RTO: 13.2 % (ref 11.8–14.4)
PLATELET # BLD: 327 K/UL (ref 138–453)
PMV BLD AUTO: 10 FL (ref 8.1–13.5)
POTASSIUM SERPL-SCNC: 3.9 MMOL/L (ref 3.7–5.3)
RBC # BLD: 4.27 M/UL (ref 3.95–5.11)
SODIUM BLD-SCNC: 140 MMOL/L (ref 135–144)
THYROXINE, FREE: 1.42 NG/DL (ref 0.93–1.7)
TOTAL PROTEIN: 6.7 G/DL (ref 6.4–8.3)
TRIGL SERPL-MCNC: 88 MG/DL
TSH SERPL DL<=0.05 MIU/L-ACNC: 1.54 MIU/L (ref 0.3–5)
URIC ACID: 4.8 MG/DL (ref 2.4–5.7)
VLDLC SERPL CALC-MCNC: NORMAL MG/DL (ref 1–30)
WBC # BLD: 5 K/UL (ref 3.5–11.3)

## 2021-09-05 PROCEDURE — 84443 ASSAY THYROID STIM HORMONE: CPT

## 2021-09-05 PROCEDURE — 85027 COMPLETE CBC AUTOMATED: CPT

## 2021-09-05 PROCEDURE — 84439 ASSAY OF FREE THYROXINE: CPT

## 2021-09-05 PROCEDURE — 36415 COLL VENOUS BLD VENIPUNCTURE: CPT

## 2021-09-05 PROCEDURE — 83036 HEMOGLOBIN GLYCOSYLATED A1C: CPT

## 2021-09-05 PROCEDURE — 80061 LIPID PANEL: CPT

## 2021-09-05 PROCEDURE — 84550 ASSAY OF BLOOD/URIC ACID: CPT

## 2021-09-05 PROCEDURE — 80053 COMPREHEN METABOLIC PANEL: CPT

## 2021-09-06 LAB
ESTIMATED AVERAGE GLUCOSE: 108 MG/DL
HBA1C MFR BLD: 5.4 % (ref 4–6)

## 2021-09-07 DIAGNOSIS — R79.89 ELEVATED SERUM CREATININE: Primary | ICD-10-CM

## 2021-09-07 DIAGNOSIS — R71.8 ELEVATED MCV: ICD-10-CM

## 2021-09-09 ENCOUNTER — CARE COORDINATION (OUTPATIENT)
Dept: CASE MANAGEMENT | Age: 71
End: 2021-09-09

## 2021-09-09 NOTE — CARE COORDINATION
[facility] Transitions Follow Up Call    2021    Patient: Valarie Teague  Patient : 1950   MRN: [id number]  Reason for Admission:   Discharge Date: 6/15/21 RARS: Readmission Risk Score: 13         Spoke with: Lulu Esquivel, patient    Contacted patient for BPCI-A follow up. Mol stated that she is doing fairly well. Reports she received the 2nd COVID vaccine last week. Felt very tired and exhausted since. She stated she had not been drinking much and labs showed she was dehydrated. Reports as of yesterday, she started to increase her water intake. She stated everything that she eats causes her be feel nauseated. Denies vomiting. CTN offered to contact PCP to request nausea medication. Patient declined and stated she does not need anything right now and will call the office if needed. Reports breathing unchanged. O2 sat 97% on RA. She reports HR elevated when climbing stairs. HR decreases once at rest and recovers within a minute. Continues to having coughing spells at times. Denies chest pain/discomfort, pressure, tightness, fever, chills. She reports her weight on Monday was 105 lbs. When weighing herself today, weight was 112 lbs. She weighed herself again shortly before CTN called and weight was 108 lbs. Recommended Mol replace the batteries in her scale. Advised she monitor weight and contact her provider with a significant weight gain. She verbalized understanding. No needs or concerns at this time. Will continue to follow. Plan for next outreach: Follow up on weight and any new or worsening symptoms. Care Transitions Subsequent and Final Call    Subsequent and Final Calls  Do you have any ongoing symptoms?: Yes  Patient-reported symptoms: Weight Gain, Other  Do you currently have any active services?: No  Do you have any needs or concerns that I can assist you with?: No  Care Transitions Interventions  Other Interventions:            Follow Up  Future Appointments   Date Time Provider Endy Guillaume   9/21/2021  9:30 AM Jesse Lira, APRN - CNP [facility] Leeanna Logan, RN

## 2021-09-13 ENCOUNTER — CARE COORDINATION (OUTPATIENT)
Dept: CASE MANAGEMENT | Age: 71
End: 2021-09-13

## 2021-09-13 NOTE — CARE COORDINATION
[facility] Transitions Follow Up Call    2021    Patient: Paola Banerjee  Patient : 1950   MRN: [id number]  Reason for Admission:   Discharge Date: 6/15/21 RARS: Readmission Risk Score: 13    Received incoming call from patient. Lindsay Sparks left message stating no need to call on Monday. She stated Friday has come and feeling excellent. Lindsay Sparks thanked CTN for caring. BPCI-A bundle ending. CTN signing off.       Nakul Louise RN

## 2021-09-20 NOTE — PROGRESS NOTES
MG/ML) 0.5% nebulizer solution Take 0.5 mLs by nebulization every 6 hours as needed for Wheezing or Shortness of Breath 120 each 3    albuterol (PROVENTIL) (2.5 MG/3ML) 0.083% nebulizer solution Take 3 mLs by nebulization every 6 hours as needed for Wheezing 120 each 3    losartan (COZAAR) 25 MG tablet Take 1 tablet by mouth daily 30 tablet 3    budesonide-formoterol (SYMBICORT) 160-4.5 MCG/ACT AERO Inhale 2 puffs into the lungs 2 times daily      albuterol sulfate HFA (VENTOLIN HFA) 108 (90 Base) MCG/ACT inhaler Inhale 2 puffs into the lungs every 4 hours as needed for Wheezing        No current facility-administered medications on file prior to visit. Subjective:     Review of Systems   Constitutional: Negative for activity change, fatigue and fever. HENT: Negative for congestion, ear pain, rhinorrhea and sore throat. Respiratory: Positive for cough (returned about a week or 2 ago), chest tightness and shortness of breath. Cardiovascular: Negative for chest pain and palpitations. Gastrointestinal: Negative for abdominal distention, abdominal pain, constipation, diarrhea and nausea. Endocrine: Negative for polydipsia, polyphagia and polyuria. Genitourinary: Negative for difficulty urinating and dysuria. Musculoskeletal: Negative for arthralgias, back pain and myalgias. Skin: Negative for color change and rash. Neurological: Negative for dizziness, weakness, light-headedness and headaches. Hematological: Negative for adenopathy. Psychiatric/Behavioral: Negative for agitation and behavioral problems. The patient is not nervous/anxious. Vitals:    09/21/21 1020   BP: 136/78   Pulse:    Resp:    Temp:    SpO2:          Objective:     Physical Exam  Vitals reviewed. Constitutional:       General: She is not in acute distress. Appearance: Normal appearance. HENT:      Head: Normocephalic and atraumatic.       Right Ear: External ear normal.      Left Ear: External ear normal.      Nose: Nose normal.      Mouth/Throat:      Mouth: Mucous membranes are moist.      Pharynx: No oropharyngeal exudate or posterior oropharyngeal erythema. Eyes:      Extraocular Movements: Extraocular movements intact. Conjunctiva/sclera: Conjunctivae normal.      Pupils: Pupils are equal, round, and reactive to light. Cardiovascular:      Rate and Rhythm: Normal rate and regular rhythm. Pulses: Normal pulses. Heart sounds: Normal heart sounds. No murmur heard. Pulmonary:      Effort: Pulmonary effort is normal. No respiratory distress. Breath sounds: Examination of the right-lower field reveals wheezing. Wheezing present. No rales. Abdominal:      General: Bowel sounds are normal. There is no distension. Palpations: Abdomen is soft. Tenderness: There is no abdominal tenderness. Musculoskeletal:         General: Normal range of motion. Cervical back: Normal range of motion. Right lower leg: No edema. Left lower leg: No edema. Lymphadenopathy:      Cervical: No cervical adenopathy. Skin:     General: Skin is warm and dry. Capillary Refill: Capillary refill takes 2 to 3 seconds. Neurological:      General: No focal deficit present. Mental Status: She is alert and oriented to person, place, and time. Deep Tendon Reflexes: Reflexes normal.   Psychiatric:         Mood and Affect: Mood is anxious. Behavior: Behavior normal. Behavior is cooperative. Assessment:      Diagnosis Orders   1. Bradycardia  EKG 12 Lead    EKG 12 Lead    Holter Monitor 48 Hour   2. Chest tightness  EKG 12 Lead    EKG 12 Lead   3. SOB (shortness of breath)     4. Hypoxia     5. Chronic obstructive pulmonary disease, unspecified COPD type ([address]       Plan:     Bradycardia  Chest tightness  - due to pts findings of bradycardia at rest and feels funny when it happens will get 48 hour holter monitor to further evaluate.    EKG completed in clinic, no signs of ischemia, showed ? right atrial enlargement. - discussed chest x-ray and lab work and pt refused to have any testing done. She did not want to get anything done as she felt it wasn't needed. - had ECHO this year in [place], will call for results. SOB (shortness of breath)  Chronic obstructive pulmonary disease with acute exacerbation (HCC)  -Stable: Medication re-filled as needed, con't medications as prescribed, con't current tx plan  - Continue with Symbicort and ventolin as previously prescribed. - would like her to get back on Singulair as this did help with the cough and SOB in the past, good rx card given to help with the cost.   - Will cont to follow with Dr. Miracle Acuña as instructed  - deep breathing encouraged. - she is to call if symptoms worsen or do not improve. - increase water intake and have labs completed to re-evaluate kidney function. - pt verbalized understanding plan of care. Medications, labs, diagnostic studies, consultations and follow-up as documented in this encounter. Rest of systems unchanged, continue current treatments    On this date 9/21/2021 I have spent 40 minutes reviewing previous notes, test results and face to face with the patient discussing the diagnosis and importance of compliance with the treatment plan as well as documenting on the day of the visit.     DIONE Ayers-CNP

## 2021-09-21 ENCOUNTER — OFFICE VISIT (OUTPATIENT)
Dept: FAMILY MEDICINE CLINIC | Age: 71
End: 2021-09-21
Payer: MEDICARE

## 2021-09-21 VITALS
HEART RATE: 93 BPM | SYSTOLIC BLOOD PRESSURE: 136 MMHG | TEMPERATURE: 97 F | DIASTOLIC BLOOD PRESSURE: 78 MMHG | RESPIRATION RATE: 20 BRPM | WEIGHT: 105 LBS | BODY MASS INDEX: 18.31 KG/M2 | OXYGEN SATURATION: 96 %

## 2021-09-21 DIAGNOSIS — J44.9 CHRONIC OBSTRUCTIVE PULMONARY DISEASE, UNSPECIFIED COPD TYPE (HCC): ICD-10-CM

## 2021-09-21 DIAGNOSIS — R00.1 BRADYCARDIA: Primary | ICD-10-CM

## 2021-09-21 DIAGNOSIS — R07.89 CHEST TIGHTNESS: ICD-10-CM

## 2021-09-21 DIAGNOSIS — R06.02 SOB (SHORTNESS OF BREATH): ICD-10-CM

## 2021-09-21 DIAGNOSIS — R09.02 HYPOXIA: ICD-10-CM

## 2021-09-21 PROCEDURE — 4040F PNEUMOC VAC/ADMIN/RCVD: CPT | Performed by: NURSE PRACTITIONER

## 2021-09-21 PROCEDURE — 1036F TOBACCO NON-USER: CPT | Performed by: NURSE PRACTITIONER

## 2021-09-21 PROCEDURE — G8400 PT W/DXA NO RESULTS DOC: HCPCS | Performed by: NURSE PRACTITIONER

## 2021-09-21 PROCEDURE — 1123F ACP DISCUSS/DSCN MKR DOCD: CPT | Performed by: NURSE PRACTITIONER

## 2021-09-21 PROCEDURE — G8419 CALC BMI OUT NRM PARAM NOF/U: HCPCS | Performed by: NURSE PRACTITIONER

## 2021-09-21 PROCEDURE — 1090F PRES/ABSN URINE INCON ASSESS: CPT | Performed by: NURSE PRACTITIONER

## 2021-09-21 PROCEDURE — 93000 ELECTROCARDIOGRAM COMPLETE: CPT | Performed by: NURSE PRACTITIONER

## 2021-09-21 PROCEDURE — G8427 DOCREV CUR MEDS BY ELIG CLIN: HCPCS | Performed by: NURSE PRACTITIONER

## 2021-09-21 PROCEDURE — 99215 OFFICE O/P EST HI 40 MIN: CPT | Performed by: NURSE PRACTITIONER

## 2021-09-21 PROCEDURE — 3017F COLORECTAL CA SCREEN DOC REV: CPT | Performed by: NURSE PRACTITIONER

## 2021-09-21 PROCEDURE — G8926 SPIRO NO PERF OR DOC: HCPCS | Performed by: NURSE PRACTITIONER

## 2021-09-21 PROCEDURE — 3023F SPIROM DOC REV: CPT | Performed by: NURSE PRACTITIONER

## 2021-09-21 RX ORDER — ALBUTEROL SULFATE 2.5 MG/3ML
2.5 SOLUTION RESPIRATORY (INHALATION) EVERY 6 HOURS PRN
Qty: 120 EACH | Refills: 3 | Status: ON HOLD
Start: 2021-09-21 | End: 2022-10-09 | Stop reason: SDUPTHER

## 2021-09-21 RX ORDER — MONTELUKAST SODIUM 10 MG/1
10 TABLET ORAL NIGHTLY
Qty: 30 TABLET | Refills: 3 | Status: ON HOLD | OUTPATIENT
Start: 2021-09-21 | End: 2022-10-09 | Stop reason: HOSPADM

## 2021-09-21 ASSESSMENT — ENCOUNTER SYMPTOMS
COLOR CHANGE: 0
CHEST TIGHTNESS: 1
COUGH: 1
ABDOMINAL PAIN: 0
SORE THROAT: 0
SHORTNESS OF BREATH: 1
NAUSEA: 0
RHINORRHEA: 0
CONSTIPATION: 0
ABDOMINAL DISTENTION: 0
BACK PAIN: 0
DIARRHEA: 0

## 2021-10-05 ENCOUNTER — HOSPITAL ENCOUNTER (OUTPATIENT)
Dept: NON INVASIVE DIAGNOSTICS | Age: 71
Discharge: HOME | End: 2021-10-05
Payer: MEDICARE

## 2021-10-05 DIAGNOSIS — R00.1 BRADYCARDIA: ICD-10-CM

## 2021-10-05 PROCEDURE — 93226 XTRNL ECG REC<48 HR SCAN A/R: CPT

## 2021-10-05 PROCEDURE — 93225 XTRNL ECG REC<48 HRS REC: CPT

## 2021-10-11 DIAGNOSIS — R00.1 BRADYCARDIA: Primary | ICD-10-CM

## 2021-10-11 NOTE — PROCEDURES
Physician interpretation:  Normal sinus rhythm. Few junctional beats. Frequent PACs. Runs of atrial tachycardia versus atrial fibrillation, longest 48 beats. Occasional PVCs.

## 2021-10-21 ENCOUNTER — OFFICE VISIT (OUTPATIENT)
Dept: FAMILY MEDICINE CLINIC | Age: 71
End: 2021-10-21
Payer: MEDICARE

## 2021-10-21 VITALS
TEMPERATURE: 97.3 F | HEART RATE: 99 BPM | SYSTOLIC BLOOD PRESSURE: 136 MMHG | WEIGHT: 109 LBS | OXYGEN SATURATION: 95 % | RESPIRATION RATE: 16 BRPM | BODY MASS INDEX: 19.01 KG/M2 | DIASTOLIC BLOOD PRESSURE: 76 MMHG

## 2021-10-21 DIAGNOSIS — R06.02 SOB (SHORTNESS OF BREATH): ICD-10-CM

## 2021-10-21 DIAGNOSIS — I47.1 ATRIAL TACHYCARDIA (HCC): Primary | ICD-10-CM

## 2021-10-21 DIAGNOSIS — K59.00 CONSTIPATION, UNSPECIFIED CONSTIPATION TYPE: ICD-10-CM

## 2021-10-21 DIAGNOSIS — J44.1 CHRONIC OBSTRUCTIVE PULMONARY DISEASE WITH ACUTE EXACERBATION (HCC): ICD-10-CM

## 2021-10-21 PROBLEM — I47.19 ATRIAL TACHYCARDIA: Status: ACTIVE | Noted: 2021-10-21

## 2021-10-21 PROCEDURE — G8484 FLU IMMUNIZE NO ADMIN: HCPCS | Performed by: NURSE PRACTITIONER

## 2021-10-21 PROCEDURE — G8420 CALC BMI NORM PARAMETERS: HCPCS | Performed by: NURSE PRACTITIONER

## 2021-10-21 PROCEDURE — G8926 SPIRO NO PERF OR DOC: HCPCS | Performed by: NURSE PRACTITIONER

## 2021-10-21 PROCEDURE — 1090F PRES/ABSN URINE INCON ASSESS: CPT | Performed by: NURSE PRACTITIONER

## 2021-10-21 PROCEDURE — 3023F SPIROM DOC REV: CPT | Performed by: NURSE PRACTITIONER

## 2021-10-21 PROCEDURE — 1036F TOBACCO NON-USER: CPT | Performed by: NURSE PRACTITIONER

## 2021-10-21 PROCEDURE — 1123F ACP DISCUSS/DSCN MKR DOCD: CPT | Performed by: NURSE PRACTITIONER

## 2021-10-21 PROCEDURE — 3017F COLORECTAL CA SCREEN DOC REV: CPT | Performed by: NURSE PRACTITIONER

## 2021-10-21 PROCEDURE — 4040F PNEUMOC VAC/ADMIN/RCVD: CPT | Performed by: NURSE PRACTITIONER

## 2021-10-21 PROCEDURE — G8427 DOCREV CUR MEDS BY ELIG CLIN: HCPCS | Performed by: NURSE PRACTITIONER

## 2021-10-21 PROCEDURE — 99214 OFFICE O/P EST MOD 30 MIN: CPT | Performed by: NURSE PRACTITIONER

## 2021-10-21 PROCEDURE — G8400 PT W/DXA NO RESULTS DOC: HCPCS | Performed by: NURSE PRACTITIONER

## 2021-10-21 RX ORDER — METOPROLOL SUCCINATE 25 MG/1
12.5 TABLET, EXTENDED RELEASE ORAL DAILY
Qty: 45 TABLET | Refills: 1 | Status: ON HOLD | OUTPATIENT
Start: 2021-10-21 | End: 2022-10-09 | Stop reason: HOSPADM

## 2021-10-21 RX ORDER — LACTULOSE 10 G/15ML
10 SOLUTION ORAL DAILY PRN
Qty: 236 ML | Refills: 1 | Status: ON HOLD | OUTPATIENT
Start: 2021-10-21 | End: 2022-10-09 | Stop reason: HOSPADM

## 2021-10-21 ASSESSMENT — ENCOUNTER SYMPTOMS
SHORTNESS OF BREATH: 1
ABDOMINAL PAIN: 0
BACK PAIN: 0
NAUSEA: 0
RHINORRHEA: 0
SORE THROAT: 0
CHEST TIGHTNESS: 1
CONSTIPATION: 0
ABDOMINAL DISTENTION: 0
DIARRHEA: 0
COLOR CHANGE: 0
COUGH: 0

## 2021-10-21 NOTE — PATIENT INSTRUCTIONS
- take daily, only time you will not take it is if HR is consistently running below 60. Patient Education     metoprolol (oral/injection)  Pronunciation:  me TOE pro lol  Brand:  K[place]spargo Sprinkle, Lopressor, Metoprolol Succinate ER, Metoprolol Tartrate, Toprol-XL  What is the most important information I should know about metoprolol? You should not use this medicine if you have a serious heart problem (heart block, sick sinus syndrome, slow heart rate), severe circulation problems, severe heart failure, or a history of slow heart beats that caused fainting. What is metoprolol? Metoprolol is a beta-blocker that affects the heart and circulation (blood flow through arteries and veins). Metoprolol is used to treat angina (chest pain) and hypertension (high blood pressure). It is also used to lower your risk of death or needing to be hospitalized for heart failure. Metoprolol injection is used during the early phase of a heart attack to lower the risk of death. Metoprolol may also be used for other purposes not listed in this medication guide. What should I discuss with my healthcare provider before taking metoprolol? You should not use this medicine if you are allergic to metoprolol, or other beta-blockers (atenolol, carvedilol, labetalol, nadolol, nebivolol, propranolol, sotalol, and others), or if you have:  · a serious heart problem such as heart block, sick sinus syndrome, or slow heart rate;  · severe circulation problems;  · severe heart failure (that required you to be in the hospital); or  · a history of slow heart beats that have caused you to faint.   Tell your doctor if you have ever had:  · asthma, chronic obstructive pulmonary disease (COPD), sleep apnea, or other breathing disorder;  · diabetes (taking metoprolol may make it harder for you to tell when you have low blood sugar);  · liver disease;  · congestive heart failure;  · problems with circulation (such as Raynaud's syndrome);  · a thyroid disorder; or  · pheochromocytoma (tumor of the adrenal gland). Do not give this medicine to a child without medical advice. Tell your doctor if you are pregnant or plan to become pregnant. It is not known whether metoprolol will harm an unborn baby. However, having high blood pressure during pregnancy may cause complications such as diabetes or eclampsia (dangerously high blood pressure that can lead to medical problems in both mother and baby). The benefit of treating hypertension may outweigh any risks to the baby. Ask a doctor before using this medicine if you are breast-feeding. Metoprolol can pass into breast milk and may cause dry skin, dry mouth, diarrhea, constipation, or slow heartbeats in your baby. How should I take metoprolol? Follow all directions on your prescription label and read all medication guides or instruction sheets. Your doctor may occasionally change your dose. Use the medicine exactly as directed. Metoprolol should be taken with a meal or just after a meal.  Take the medicine at the same time each day. Swallow the capsule  whole and do not crush, chew, break, or open it. A Toprol XL  tablet can be divided in half if your doctor has told you to do so. Swallow the half-tablet whole, without chewing or crushing. Measure liquid medicine carefully. Use the dosing syringe provided, or use a medicine dose-measuring device (not a kitchen spoon). You will need frequent medical tests, and your blood pressure will need to be checked often. If you need surgery, tell the surgeon ahead of time that you are using metoprolol. You should not stop using metoprolol suddenly. Stopping suddenly may make your condition worse. If you have high blood pressure, keep using this medicine even if you feel well. High blood pressure often has no symptoms. You may need to use metoprolol for the rest of your life. Store at room temperature away from moisture and heat.   Metoprolol injection is given as an infusion into a vein. A healthcare provider will give you this injection in a medical setting where your heart and blood pressure can be monitored. Metoprolol injections are given for only a short time before switching you to the oral form of this medicine. What happens if I miss a dose? Skip the missed dose and use your next dose at the regular time. Do not use two doses at one time. What happens if I overdose? Seek emergency medical attention or call the Poison Help line at 1-944.708.6153. What should I avoid while taking metoprolol? Avoid driving or hazardous activity until you know how this medicine will affect you. Your reactions could be impaired. Drinking alcohol can increase certain side effects of metoprolol. What are the possible side effects of metoprolol? Get emergency medical help if you have signs of an allergic reaction: hives; difficulty breathing; swelling of your face, lips, tongue, or throat. Call your doctor at once if you have:  · very slow heartbeats;  · a light-headed feeling, like you might pass out;  · shortness of breath (even with mild exertion), swelling, rapid weight gain; or  · cold feeling in your hands and feet. Common side effects may include:  · dizziness, tired feeling;  · depression, confusion, memory problems;  · nightmares, trouble sleeping;  · diarrhea; or  · mild itching or rash. This is not a complete list of side effects and others may occur. Call your doctor for medical advice about side effects. You may report side effects to FDA at 1-322-GAI-8061. What other drugs will affect metoprolol? Tell your doctor about all your current medicines.  Many drugs can affect metoprolol, especially:  · any other heart or blood pressure medications;  · epinephrine (Epi-Pen);  · an antidepressant;  · an ergot medicine --dihydroergotamine, ergonovine, ergotamine, methylergonovine; or  · an MAO inhibitor --isocarboxazid, linezolid, phenelzine, rasagiline, selegiline, tranylcypromine. This list is not complete and many other drugs may affect metoprolol. This includes prescription and over-the-counter medicines, vitamins, and herbal products. Not all possible drug interactions are listed here. Where can I get more information? Your pharmacist can provide more information about metoprolol. Remember, keep this and all other medicines out of the reach of children, never share your medicines with others, and use this medication only for the indication prescribed. Every effort has been made to ensure that the information provided by [address] is accurate, up-to-date, and complete, but no guarantee is made to that effect. Drug information contained herein may be time sensitive. [facility] information has been compiled for use by healthcare practitioners and consumers in the [place] and therefore [facility] does not warrant that uses outside of the [place] are appropriate, unless specifically indicated otherwise. [facility]'s drug information does not endorse drugs, diagnose patients or recommend therapy. [facility] drug information is an informational resource designed to assist licensed healthcare practitioners in caring for their patients and/or to serve consumers viewing this service as a supplement to, and not a substitute for, the expertise, skill, knowledge and judgment of healthcare practitioners. The absence of a warning for a given drug or drug combination in no way should be construed to indicate that the drug or drug combination is safe, effective or appropriate for any given patient. [facility] does not assume any responsibility for any aspect of healthcare administered with the aid of information [facility] provides. The information contained herein is not intended to cover all possible uses, directions, precautions, warnings, drug interactions, allergic reactions, or adverse effects.  If you have questions about the drugs you are taking, check with your doctor, nurse or pharmacist.  Copyright 2404-6204 [employer] [employer] Version: 17.03. Revision date: 5/29/2019. Care instructions adapted under license by [facility] ([facility]). If you have questions about a medical condition or this instruction, always ask your healthcare professional. [place] [address] any warranty or liability for your use of this information.

## 2021-10-21 NOTE — PROGRESS NOTES
Silvana North, APRN-CNP    [address] [facility]  [address] [facility], [address]  [address] [address]  Dept: [phone]  Dept Fax: [phone]     Patient ID: Kennedi Squires is a 66 y.o. female Established patient    HPI    Pt here today for f/u, go over labs and/or diagnostic studies, and medication refills. Pt denies any fever or chills. Pt today denies any HA, chest pain, or SOB. Pt denies any N/V/D/C or abdominal pain. BP this morning at home is 113/78 with HR 93    - feels like she has been feeling better overall, it seems since it is getting colder out she is able to catch breath. She does still get SOB at times and the funny feeling where it feels like her HR is low. - needs refill on lactulose that she was prescribed from previous PCP for constipation, it is the only thing that seems to help. The patient's past medical, surgical, social, and family history as well as his current medications and allergies were reviewed as documented in today's encounter SUMIT Escobedo. Previous office notes, labs, imaging and hospital records were reviewed prior to and during encounter. Current Outpatient Medications on File Prior to Visit   Medication Sig Dispense Refill    montelukast (SINGULAIR) 10 MG tablet Take 1 tablet by mouth nightly 30 tablet 3    albuterol (PROVENTIL) (2.5 MG/3ML) 0.083% nebulizer solution Take 3 mLs by nebulization every 6 hours as needed for Wheezing 120 each 3    losartan (COZAAR) 25 MG tablet Take 1 tablet by mouth daily (Patient not taking: Reported on 9/21/2021) 30 tablet 3    budesonide-formoterol (SYMBICORT) 160-4.5 MCG/ACT AERO Inhale 2 puffs into the lungs 2 times daily      albuterol sulfate HFA (VENTOLIN HFA) 108 (90 Base) MCG/ACT inhaler Inhale 2 puffs into the lungs every 4 hours as needed for Wheezing        No current facility-administered medications on file prior to visit.      Subjective:    Review of Systems   Constitutional: Negative for activity change, fatigue and fever. HENT: Negative for congestion, ear pain, rhinorrhea and sore throat. Respiratory: Positive for chest tightness (occasional with trouble breathing) and shortness of breath (sometimes but seems to have improved). Negative for cough. Cardiovascular: Negative for chest pain and palpitations. Gastrointestinal: Negative for abdominal distention, abdominal pain, constipation, diarrhea and nausea. Endocrine: Negative for polydipsia, polyphagia and polyuria. Genitourinary: Negative for difficulty urinating and dysuria. Musculoskeletal: Negative for arthralgias, back pain and myalgias. Skin: Negative for color change and rash. Neurological: Positive for light-headedness (when it feels like her HR is dropping it happens). Negative for dizziness, weakness and headaches. Hematological: Negative for adenopathy. Psychiatric/Behavioral: Negative for agitation and behavioral problems. The patient is not nervous/anxious. Vitals:    10/21/21 0955   BP: 136/76   Pulse: 99   Resp: 16   Temp: 97.3 °F (36.3 °C)   SpO2: 95%     Objective:   Physical Exam  Vitals reviewed. Constitutional:       General: She is not in acute distress. Appearance: Normal appearance. HENT:      Head: Normocephalic and atraumatic. Right Ear: External ear normal.      Left Ear: External ear normal.      Nose: Nose normal.      Mouth/Throat:      Mouth: Mucous membranes are moist.      Pharynx: No oropharyngeal exudate or posterior oropharyngeal erythema. Eyes:      Extraocular Movements: Extraocular movements intact. Conjunctiva/sclera: Conjunctivae normal.      Pupils: Pupils are equal, round, and reactive to light. Cardiovascular:      Rate and Rhythm: Normal rate and regular rhythm. Pulses: Normal pulses. Heart sounds: Normal heart sounds. No murmur heard.      Pulmonary:      Effort: Pulmonary effort is normal. No respiratory distress. Breath sounds: Normal breath sounds. No wheezing or rales. Abdominal:      General: Bowel sounds are normal. There is no distension. Palpations: Abdomen is soft. Tenderness: There is no abdominal tenderness. Musculoskeletal:         General: Normal range of motion. Cervical back: Normal range of motion. Right lower leg: No edema. Left lower leg: No edema. Lymphadenopathy:      Cervical: No cervical adenopathy. Skin:     General: Skin is warm and dry. Neurological:      General: No focal deficit present. Mental Status: She is alert and oriented to person, place, and time. Deep Tendon Reflexes: Reflexes normal.   Psychiatric:         Mood and Affect: Mood normal.         Behavior: Behavior normal. Behavior is cooperative. Assessment:      Diagnosis Orders   1. Atrial tachycardia (HCC)  [facility] - Erik Aguirre DO, Cardiology, [place]    metoprolol succinate (TOPROL XL) 25 MG extended release tablet   2. Constipation, unspecified constipation type  lactulose (CHRONULAC) 10 GM/15ML solution   3. SOB (shortness of breath)     4. Chronic obstructive pulmonary disease with acute exacerbation (HCC)       Plan:     Atrial tachycardia (HCC)  - will start Toprol 12.5 mg, discussed medication and side effects.  - holter monitor findings (atrial tach vs atrial fib), referral placed for cardiology for further evaluation and treatment. constipation  - Stable: Medication re-filled as needed, con't medications as prescribed, con't current tx plan  - Continue lactulose as previously prescribed   - A high fiber diet with plenty of fluids (up to 8 glasses of water daily) is suggested to relieve these symptoms. SOB (shortness of breath)  Chronic obstructive pulmonary disease with acute exacerbation (HCC)  -Stable: Medication re-filled as needed, con't medications as prescribed, con't current tx plan  - Continue with Symbicort and ventolin as previously prescribed.    - continue Singulair as prescribed. - Will cont to follow with Dr. Pablito James as instructed  - deep breathing encouraged. - she is to call if symptoms worsen or do not improve. - increase water intake and have labs completed to re-evaluate kidney function. Return in about 6 weeks (around 12/2/2021) for HR and BP check . - pt verbalized understanding plan of care. Medications, labs, diagnostic studies, consultations and follow-up as documented in this encounter. Rest of systems unchanged, continue current treatments    On this date 9/21/2021 I have spent 40 minutes reviewing previous notes, test results and face to face with the patient discussing the diagnosis and importance of compliance with the treatment plan as well as documenting on the day of the visit.     Wanda James, DIONE-CNP

## 2021-11-19 ENCOUNTER — HOSPITAL ENCOUNTER (OUTPATIENT)
Dept: NUCLEAR MEDICINE | Age: 71
Discharge: HOME | End: 2021-11-21
Payer: MEDICARE

## 2021-11-19 ENCOUNTER — HOSPITAL ENCOUNTER (OUTPATIENT)
Dept: NON INVASIVE DIAGNOSTICS | Age: 71
Discharge: HOME | End: 2021-11-21
Payer: MEDICARE

## 2021-11-19 VITALS — DIASTOLIC BLOOD PRESSURE: 86 MMHG | SYSTOLIC BLOOD PRESSURE: 161 MMHG | HEART RATE: 90 BPM

## 2021-11-19 DIAGNOSIS — I49.1 PAC (PREMATURE ATRIAL CONTRACTION): ICD-10-CM

## 2021-11-19 DIAGNOSIS — I49.9 VENTRICULAR ARRHYTHMIA: ICD-10-CM

## 2021-11-19 DIAGNOSIS — R06.09 OTHER FORM OF DYSPNEA: ICD-10-CM

## 2021-11-19 DIAGNOSIS — R00.2 PALPITATION: ICD-10-CM

## 2021-11-19 LAB
LV EF: 60 %
LVEF MODALITY: NORMAL

## 2021-11-19 PROCEDURE — 3430000000 HC RX DIAGNOSTIC RADIOPHARMACEUTICAL: Performed by: INTERNAL MEDICINE

## 2021-11-19 PROCEDURE — 6360000002 HC RX W HCPCS: Performed by: INTERNAL MEDICINE

## 2021-11-19 PROCEDURE — 2580000003 HC RX 258: Performed by: INTERNAL MEDICINE

## 2021-11-19 PROCEDURE — 93017 CV STRESS TEST TRACING ONLY: CPT

## 2021-11-19 PROCEDURE — A9500 TC99M SESTAMIBI: HCPCS | Performed by: INTERNAL MEDICINE

## 2021-11-19 PROCEDURE — 78452 HT MUSCLE IMAGE SPECT MULT: CPT

## 2021-11-19 RX ORDER — SODIUM CHLORIDE 9 MG/ML
500 INJECTION, SOLUTION INTRAVENOUS CONTINUOUS PRN
Status: DISCONTINUED | OUTPATIENT
Start: 2021-11-19 | End: 2021-11-19 | Stop reason: HOSPADM

## 2021-11-19 RX ORDER — ATROPINE SULFATE 0.1 MG/ML
0.5 INJECTION INTRAVENOUS EVERY 5 MIN PRN
Status: DISCONTINUED | OUTPATIENT
Start: 2021-11-19 | End: 2021-11-19 | Stop reason: HOSPADM

## 2021-11-19 RX ORDER — SODIUM CHLORIDE 0.9 % (FLUSH) 0.9 %
5-40 SYRINGE (ML) INJECTION PRN
Status: DISCONTINUED | OUTPATIENT
Start: 2021-11-19 | End: 2021-11-19 | Stop reason: HOSPADM

## 2021-11-19 RX ORDER — ALBUTEROL SULFATE 90 UG/1
2 AEROSOL, METERED RESPIRATORY (INHALATION) PRN
Status: DISCONTINUED | OUTPATIENT
Start: 2021-11-19 | End: 2021-11-19 | Stop reason: HOSPADM

## 2021-11-19 RX ORDER — SODIUM CHLORIDE 0.9 % (FLUSH) 0.9 %
10 SYRINGE (ML) INJECTION PRN
Status: COMPLETED | OUTPATIENT
Start: 2021-11-19 | End: 2021-11-19

## 2021-11-19 RX ORDER — METOPROLOL TARTRATE 5 MG/5ML
5 INJECTION INTRAVENOUS EVERY 5 MIN PRN
Status: DISCONTINUED | OUTPATIENT
Start: 2021-11-19 | End: 2021-11-19 | Stop reason: HOSPADM

## 2021-11-19 RX ORDER — AMINOPHYLLINE DIHYDRATE 25 MG/ML
50 INJECTION, SOLUTION INTRAVENOUS PRN
Status: DISCONTINUED | OUTPATIENT
Start: 2021-11-19 | End: 2021-11-19 | Stop reason: HOSPADM

## 2021-11-19 RX ORDER — NITROGLYCERIN 0.4 MG/1
0.4 TABLET SUBLINGUAL EVERY 5 MIN PRN
Status: DISCONTINUED | OUTPATIENT
Start: 2021-11-19 | End: 2021-11-19 | Stop reason: HOSPADM

## 2021-11-19 RX ADMIN — REGADENOSON 0.4 MG: 0.08 INJECTION, SOLUTION INTRAVENOUS at 09:00

## 2021-11-19 RX ADMIN — TETRAKIS(2-METHOXYISOBUTYLISOCYANIDE)COPPER(I) TETRAFLUOROBORATE 9.18 MILLICURIE: 1 INJECTION, POWDER, LYOPHILIZED, FOR SOLUTION INTRAVENOUS at 07:28

## 2021-11-19 RX ADMIN — SODIUM CHLORIDE, PRESERVATIVE FREE 10 ML: 5 INJECTION INTRAVENOUS at 09:00

## 2021-11-19 RX ADMIN — TETRAKIS(2-METHOXYISOBUTYLISOCYANIDE)COPPER(I) TETRAFLUOROBORATE 31.2 MILLICURIE: 1 INJECTION, POWDER, LYOPHILIZED, FOR SOLUTION INTRAVENOUS at 09:00

## 2021-11-19 RX ADMIN — SODIUM CHLORIDE, PRESERVATIVE FREE 10 ML: 5 INJECTION INTRAVENOUS at 07:28

## 2021-11-19 NOTE — PROGRESS NOTES
Patient present for stress exercise echo. Educated on procedure. All questions/concerns addressed. Allergies and medication reviewed. Patient prepped for procedure. Stress test will be supervised by LAINE David.

## 2021-11-19 NOTE — PROGRESS NOTES
Lexiscan nuclear stress completed and reviewed by LAINE Orellana. Patient began gagging, had shortness of breath and chest tingling post Lexiscan injection otherwise, tolerated test well. Coffee provided. Symptoms dissipated in recvoery. IV removed. VS returned to baseline, see flow sheets for documented VS throughout procedure.

## 2022-06-03 ENCOUNTER — TELEPHONE (OUTPATIENT)
Dept: ONCOLOGY | Age: 72
End: 2022-06-03

## 2022-06-03 DIAGNOSIS — Z87.891 PERSONAL HISTORY OF NICOTINE DEPENDENCE: Primary | ICD-10-CM

## 2022-06-03 NOTE — TELEPHONE ENCOUNTER
Our records indicate that your patient is coming due for their annual lung cancer screening follow up testing. For your convenience, we have pended the order for the scan for you. If you do not agree with the need for the test, please cancel the order and let us know. Sincerely,    [address] [place] [address] Screening Program    Auto printed reminder letter sent to patient.

## 2022-10-06 ENCOUNTER — HOSPITAL ENCOUNTER (INPATIENT)
Age: 72
LOS: 3 days | Discharge: HOME | DRG: 192 | End: 2022-10-09
Attending: EMERGENCY MEDICINE | Admitting: STUDENT IN AN ORGANIZED HEALTH CARE EDUCATION/TRAINING PROGRAM
Payer: MEDICARE

## 2022-10-06 ENCOUNTER — APPOINTMENT (OUTPATIENT)
Dept: GENERAL RADIOLOGY | Age: 72
DRG: 192 | End: 2022-10-06
Payer: MEDICARE

## 2022-10-06 DIAGNOSIS — J44.1 COPD EXACERBATION (HCC): ICD-10-CM

## 2022-10-06 DIAGNOSIS — J45.901 MODERATE ASTHMA WITH EXACERBATION, UNSPECIFIED WHETHER PERSISTENT: Primary | ICD-10-CM

## 2022-10-06 LAB
SARS-COV-2, RAPID: NOT DETECTED
SPECIMEN DESCRIPTION: NORMAL
TROPONIN, HIGH SENSITIVITY: 34 NG/L (ref 0–14)

## 2022-10-06 PROCEDURE — 36415 COLL VENOUS BLD VENIPUNCTURE: CPT

## 2022-10-06 PROCEDURE — 87635 SARS-COV-2 COVID-19 AMP PRB: CPT

## 2022-10-06 PROCEDURE — 94640 AIRWAY INHALATION TREATMENT: CPT

## 2022-10-06 PROCEDURE — 84484 ASSAY OF TROPONIN QUANT: CPT

## 2022-10-06 PROCEDURE — 99285 EMERGENCY DEPT VISIT HI MDM: CPT

## 2022-10-06 PROCEDURE — 2060000000 HC ICU INTERMEDIATE R&B

## 2022-10-06 PROCEDURE — 6370000000 HC RX 637 (ALT 250 FOR IP)

## 2022-10-06 PROCEDURE — 71045 X-RAY EXAM CHEST 1 VIEW: CPT

## 2022-10-06 PROCEDURE — 6360000002 HC RX W HCPCS: Performed by: EMERGENCY MEDICINE

## 2022-10-06 PROCEDURE — 93005 ELECTROCARDIOGRAM TRACING: CPT | Performed by: EMERGENCY MEDICINE

## 2022-10-06 RX ORDER — ONDANSETRON 2 MG/ML
4 INJECTION INTRAMUSCULAR; INTRAVENOUS EVERY 6 HOURS PRN
Status: DISCONTINUED | OUTPATIENT
Start: 2022-10-06 | End: 2022-10-09 | Stop reason: HOSPADM

## 2022-10-06 RX ORDER — IPRATROPIUM BROMIDE AND ALBUTEROL SULFATE 2.5; .5 MG/3ML; MG/3ML
1 SOLUTION RESPIRATORY (INHALATION)
Status: DISCONTINUED | OUTPATIENT
Start: 2022-10-07 | End: 2022-10-06 | Stop reason: SDUPTHER

## 2022-10-06 RX ORDER — ONDANSETRON 4 MG/1
4 TABLET, ORALLY DISINTEGRATING ORAL EVERY 8 HOURS PRN
Status: DISCONTINUED | OUTPATIENT
Start: 2022-10-06 | End: 2022-10-09 | Stop reason: HOSPADM

## 2022-10-06 RX ORDER — ENOXAPARIN SODIUM 100 MG/ML
30 INJECTION SUBCUTANEOUS DAILY
Status: DISCONTINUED | OUTPATIENT
Start: 2022-10-07 | End: 2022-10-09 | Stop reason: HOSPADM

## 2022-10-06 RX ORDER — IPRATROPIUM BROMIDE AND ALBUTEROL SULFATE 2.5; .5 MG/3ML; MG/3ML
SOLUTION RESPIRATORY (INHALATION)
Status: COMPLETED
Start: 2022-10-06 | End: 2022-10-06

## 2022-10-06 RX ORDER — MONTELUKAST SODIUM 10 MG/1
10 TABLET ORAL NIGHTLY
Status: DISCONTINUED | OUTPATIENT
Start: 2022-10-07 | End: 2022-10-09 | Stop reason: HOSPADM

## 2022-10-06 RX ORDER — SODIUM CHLORIDE 9 MG/ML
INJECTION, SOLUTION INTRAVENOUS PRN
Status: DISCONTINUED | OUTPATIENT
Start: 2022-10-06 | End: 2022-10-09 | Stop reason: HOSPADM

## 2022-10-06 RX ORDER — LACTULOSE 10 G/15ML
10 SOLUTION ORAL DAILY PRN
Status: DISCONTINUED | OUTPATIENT
Start: 2022-10-06 | End: 2022-10-09 | Stop reason: HOSPADM

## 2022-10-06 RX ORDER — METHYLPREDNISOLONE SODIUM SUCCINATE 125 MG/2ML
125 INJECTION, POWDER, LYOPHILIZED, FOR SOLUTION INTRAMUSCULAR; INTRAVENOUS ONCE
Status: COMPLETED | OUTPATIENT
Start: 2022-10-06 | End: 2022-10-06

## 2022-10-06 RX ORDER — ACETAMINOPHEN 325 MG/1
650 TABLET ORAL EVERY 6 HOURS PRN
Status: DISCONTINUED | OUTPATIENT
Start: 2022-10-06 | End: 2022-10-09 | Stop reason: HOSPADM

## 2022-10-06 RX ORDER — ALBUTEROL SULFATE 2.5 MG/3ML
2.5 SOLUTION RESPIRATORY (INHALATION)
Status: DISCONTINUED | OUTPATIENT
Start: 2022-10-06 | End: 2022-10-09 | Stop reason: HOSPADM

## 2022-10-06 RX ORDER — SODIUM CHLORIDE 0.9 % (FLUSH) 0.9 %
5-40 SYRINGE (ML) INJECTION EVERY 12 HOURS SCHEDULED
Status: DISCONTINUED | OUTPATIENT
Start: 2022-10-07 | End: 2022-10-09 | Stop reason: HOSPADM

## 2022-10-06 RX ORDER — IPRATROPIUM BROMIDE AND ALBUTEROL SULFATE 2.5; .5 MG/3ML; MG/3ML
1 SOLUTION RESPIRATORY (INHALATION)
Status: DISCONTINUED | OUTPATIENT
Start: 2022-10-07 | End: 2022-10-09 | Stop reason: HOSPADM

## 2022-10-06 RX ORDER — METOPROLOL SUCCINATE 25 MG/1
12.5 TABLET, EXTENDED RELEASE ORAL DAILY
Status: DISCONTINUED | OUTPATIENT
Start: 2022-10-07 | End: 2022-10-09 | Stop reason: HOSPADM

## 2022-10-06 RX ORDER — ALBUTEROL SULFATE 2.5 MG/3ML
2.5 SOLUTION RESPIRATORY (INHALATION) ONCE
Status: COMPLETED | OUTPATIENT
Start: 2022-10-06 | End: 2022-10-06

## 2022-10-06 RX ORDER — POLYETHYLENE GLYCOL 3350 17 G/17G
17 POWDER, FOR SOLUTION ORAL DAILY PRN
Status: DISCONTINUED | OUTPATIENT
Start: 2022-10-06 | End: 2022-10-09 | Stop reason: HOSPADM

## 2022-10-06 RX ORDER — PREDNISONE 20 MG/1
40 TABLET ORAL DAILY
Status: DISCONTINUED | OUTPATIENT
Start: 2022-10-09 | End: 2022-10-07

## 2022-10-06 RX ORDER — METHYLPREDNISOLONE SODIUM SUCCINATE 40 MG/ML
40 INJECTION, POWDER, LYOPHILIZED, FOR SOLUTION INTRAMUSCULAR; INTRAVENOUS EVERY 6 HOURS
Status: DISCONTINUED | OUTPATIENT
Start: 2022-10-06 | End: 2022-10-07

## 2022-10-06 RX ORDER — SODIUM CHLORIDE 0.9 % (FLUSH) 0.9 %
5-40 SYRINGE (ML) INJECTION PRN
Status: DISCONTINUED | OUTPATIENT
Start: 2022-10-06 | End: 2022-10-09 | Stop reason: HOSPADM

## 2022-10-06 RX ORDER — SODIUM CHLORIDE 9 MG/ML
INJECTION, SOLUTION INTRAVENOUS CONTINUOUS
Status: DISCONTINUED | OUTPATIENT
Start: 2022-10-07 | End: 2022-10-07

## 2022-10-06 RX ADMIN — ALBUTEROL SULFATE 2.5 MG: 2.5 SOLUTION RESPIRATORY (INHALATION) at 20:23

## 2022-10-06 RX ADMIN — METHYLPREDNISOLONE SODIUM SUCCINATE 125 MG: 125 INJECTION, POWDER, FOR SOLUTION INTRAMUSCULAR; INTRAVENOUS at 20:23

## 2022-10-06 RX ADMIN — IPRATROPIUM BROMIDE AND ALBUTEROL SULFATE 3 ML: .5; 2.5 SOLUTION RESPIRATORY (INHALATION) at 23:08

## 2022-10-06 RX ADMIN — IPRATROPIUM BROMIDE AND ALBUTEROL SULFATE 3 ML: 2.5; .5 SOLUTION RESPIRATORY (INHALATION) at 23:08

## 2022-10-06 ASSESSMENT — ENCOUNTER SYMPTOMS
ALLERGIC/IMMUNOLOGIC NEGATIVE: 1
SHORTNESS OF BREATH: 1
GASTROINTESTINAL NEGATIVE: 1
EYES NEGATIVE: 1
WHEEZING: 1

## 2022-10-06 ASSESSMENT — PAIN - FUNCTIONAL ASSESSMENT: PAIN_FUNCTIONAL_ASSESSMENT: NONE - DENIES PAIN

## 2022-10-07 ENCOUNTER — APPOINTMENT (OUTPATIENT)
Dept: GENERAL RADIOLOGY | Age: 72
DRG: 192 | End: 2022-10-07
Payer: MEDICARE

## 2022-10-07 LAB
ANION GAP SERPL CALCULATED.3IONS-SCNC: 12 MMOL/L (ref 9–17)
BUN BLDV-MCNC: 20 MG/DL (ref 8–23)
CALCIUM SERPL-MCNC: 9.2 MG/DL (ref 8.6–10.4)
CHLORIDE BLD-SCNC: 101 MMOL/L (ref 98–107)
CO2: 23 MMOL/L (ref 20–31)
CREAT SERPL-MCNC: 0.99 MG/DL (ref 0.5–0.9)
D-DIMER QUANTITATIVE: 0.61 MG/L FEU
GFR SERPL CREATININE-BSD FRML MDRD: >60 ML/MIN/1.73M2
GLUCOSE BLD-MCNC: 170 MG/DL (ref 70–99)
POTASSIUM SERPL-SCNC: 5 MMOL/L (ref 3.7–5.3)
SODIUM BLD-SCNC: 136 MMOL/L (ref 135–144)

## 2022-10-07 PROCEDURE — 97535 SELF CARE MNGMENT TRAINING: CPT

## 2022-10-07 PROCEDURE — 99221 1ST HOSP IP/OBS SF/LOW 40: CPT | Performed by: STUDENT IN AN ORGANIZED HEALTH CARE EDUCATION/TRAINING PROGRAM

## 2022-10-07 PROCEDURE — 2060000000 HC ICU INTERMEDIATE R&B

## 2022-10-07 PROCEDURE — 97162 PT EVAL MOD COMPLEX 30 MIN: CPT

## 2022-10-07 PROCEDURE — 97530 THERAPEUTIC ACTIVITIES: CPT

## 2022-10-07 PROCEDURE — 6360000002 HC RX W HCPCS: Performed by: NURSE PRACTITIONER

## 2022-10-07 PROCEDURE — 6370000000 HC RX 637 (ALT 250 FOR IP): Performed by: NURSE PRACTITIONER

## 2022-10-07 PROCEDURE — 6370000000 HC RX 637 (ALT 250 FOR IP): Performed by: STUDENT IN AN ORGANIZED HEALTH CARE EDUCATION/TRAINING PROGRAM

## 2022-10-07 PROCEDURE — 94640 AIRWAY INHALATION TREATMENT: CPT

## 2022-10-07 PROCEDURE — 36415 COLL VENOUS BLD VENIPUNCTURE: CPT

## 2022-10-07 PROCEDURE — 94760 N-INVAS EAR/PLS OXIMETRY 1: CPT

## 2022-10-07 PROCEDURE — 2500000003 HC RX 250 WO HCPCS: Performed by: NURSE PRACTITIONER

## 2022-10-07 PROCEDURE — 2580000003 HC RX 258: Performed by: NURSE PRACTITIONER

## 2022-10-07 PROCEDURE — 97116 GAIT TRAINING THERAPY: CPT

## 2022-10-07 PROCEDURE — 71045 X-RAY EXAM CHEST 1 VIEW: CPT

## 2022-10-07 PROCEDURE — 2700000000 HC OXYGEN THERAPY PER DAY

## 2022-10-07 PROCEDURE — 85379 FIBRIN DEGRADATION QUANT: CPT

## 2022-10-07 PROCEDURE — 6360000002 HC RX W HCPCS: Performed by: STUDENT IN AN ORGANIZED HEALTH CARE EDUCATION/TRAINING PROGRAM

## 2022-10-07 PROCEDURE — 97166 OT EVAL MOD COMPLEX 45 MIN: CPT

## 2022-10-07 PROCEDURE — 80048 BASIC METABOLIC PNL TOTAL CA: CPT

## 2022-10-07 RX ORDER — AZITHROMYCIN 250 MG/1
500 TABLET, FILM COATED ORAL DAILY
Status: COMPLETED | OUTPATIENT
Start: 2022-10-07 | End: 2022-10-09

## 2022-10-07 RX ORDER — IBUPROFEN 200 MG
400 TABLET ORAL ONCE
Status: COMPLETED | OUTPATIENT
Start: 2022-10-07 | End: 2022-10-07

## 2022-10-07 RX ORDER — TRAMADOL HYDROCHLORIDE 50 MG/1
50 TABLET ORAL ONCE
Status: COMPLETED | OUTPATIENT
Start: 2022-10-07 | End: 2022-10-07

## 2022-10-07 RX ORDER — METHYLPREDNISOLONE SODIUM SUCCINATE 40 MG/ML
40 INJECTION, POWDER, LYOPHILIZED, FOR SOLUTION INTRAMUSCULAR; INTRAVENOUS EVERY 12 HOURS
Status: DISCONTINUED | OUTPATIENT
Start: 2022-10-07 | End: 2022-10-09

## 2022-10-07 RX ORDER — METOPROLOL TARTRATE 5 MG/5ML
5 INJECTION INTRAVENOUS ONCE
Status: COMPLETED | OUTPATIENT
Start: 2022-10-07 | End: 2022-10-07

## 2022-10-07 RX ADMIN — MONTELUKAST 10 MG: 10 TABLET, FILM COATED ORAL at 20:13

## 2022-10-07 RX ADMIN — METHYLPREDNISOLONE SODIUM SUCCINATE 40 MG: 40 INJECTION, POWDER, FOR SOLUTION INTRAMUSCULAR; INTRAVENOUS at 00:13

## 2022-10-07 RX ADMIN — IPRATROPIUM BROMIDE AND ALBUTEROL SULFATE 1 AMPULE: .5; 3 SOLUTION RESPIRATORY (INHALATION) at 19:37

## 2022-10-07 RX ADMIN — IBUPROFEN 400 MG: 200 TABLET, FILM COATED ORAL at 18:26

## 2022-10-07 RX ADMIN — AZITHROMYCIN MONOHYDRATE 500 MG: 250 TABLET ORAL at 09:59

## 2022-10-07 RX ADMIN — METOPROLOL SUCCINATE 12.5 MG: 25 TABLET, EXTENDED RELEASE ORAL at 09:59

## 2022-10-07 RX ADMIN — SODIUM CHLORIDE, PRESERVATIVE FREE 10 ML: 5 INJECTION INTRAVENOUS at 09:59

## 2022-10-07 RX ADMIN — SODIUM CHLORIDE, PRESERVATIVE FREE 10 ML: 5 INJECTION INTRAVENOUS at 20:14

## 2022-10-07 RX ADMIN — METHYLPREDNISOLONE SODIUM SUCCINATE 40 MG: 40 INJECTION, POWDER, FOR SOLUTION INTRAMUSCULAR; INTRAVENOUS at 18:16

## 2022-10-07 RX ADMIN — METHYLPREDNISOLONE SODIUM SUCCINATE 40 MG: 40 INJECTION, POWDER, FOR SOLUTION INTRAMUSCULAR; INTRAVENOUS at 06:08

## 2022-10-07 RX ADMIN — IPRATROPIUM BROMIDE AND ALBUTEROL SULFATE 1 AMPULE: .5; 3 SOLUTION RESPIRATORY (INHALATION) at 16:27

## 2022-10-07 RX ADMIN — MONTELUKAST 10 MG: 10 TABLET, FILM COATED ORAL at 00:13

## 2022-10-07 RX ADMIN — SODIUM CHLORIDE, PRESERVATIVE FREE 10 ML: 5 INJECTION INTRAVENOUS at 00:07

## 2022-10-07 RX ADMIN — IPRATROPIUM BROMIDE AND ALBUTEROL SULFATE 1 AMPULE: .5; 3 SOLUTION RESPIRATORY (INHALATION) at 12:56

## 2022-10-07 RX ADMIN — ENOXAPARIN SODIUM 30 MG: 100 INJECTION SUBCUTANEOUS at 09:59

## 2022-10-07 RX ADMIN — IPRATROPIUM BROMIDE AND ALBUTEROL SULFATE 1 AMPULE: .5; 3 SOLUTION RESPIRATORY (INHALATION) at 08:56

## 2022-10-07 RX ADMIN — ONDANSETRON 4 MG: 2 INJECTION INTRAMUSCULAR; INTRAVENOUS at 18:17

## 2022-10-07 RX ADMIN — TRAMADOL HYDROCHLORIDE 50 MG: 50 TABLET, COATED ORAL at 02:29

## 2022-10-07 RX ADMIN — METOPROLOL TARTRATE 5 MG: 5 INJECTION, SOLUTION INTRAVENOUS at 01:00

## 2022-10-07 RX ADMIN — ACETAMINOPHEN 650 MG: 325 TABLET ORAL at 00:17

## 2022-10-07 RX ADMIN — SODIUM CHLORIDE: 9 INJECTION, SOLUTION INTRAVENOUS at 00:07

## 2022-10-07 ASSESSMENT — PAIN SCALES - GENERAL
PAINLEVEL_OUTOF10: 7
PAINLEVEL_OUTOF10: 2
PAINLEVEL_OUTOF10: 0
PAINLEVEL_OUTOF10: 6
PAINLEVEL_OUTOF10: 5
PAINLEVEL_OUTOF10: 4

## 2022-10-07 ASSESSMENT — PAIN DESCRIPTION - DESCRIPTORS
DESCRIPTORS: ACHING
DESCRIPTORS: DULL;THROBBING
DESCRIPTORS: ACHING

## 2022-10-07 ASSESSMENT — PAIN DESCRIPTION - LOCATION
LOCATION: HEAD
LOCATION: NECK
LOCATION: HEAD

## 2022-10-07 ASSESSMENT — PAIN DESCRIPTION - FREQUENCY: FREQUENCY: CONTINUOUS

## 2022-10-07 ASSESSMENT — PAIN DESCRIPTION - ONSET: ONSET: ON-GOING

## 2022-10-07 NOTE — ED PROVIDER NOTES
eMERGENCY dEPARTMENT eNCOUnter      Pt Name: Karyn Rajput  MRN: [id number]  [place] 1950  Date of evaluation: 10/6/2022      CHIEF COMPLAINT       Chief Complaint   Patient presents with    Shortness of Breath    Wheezing     Patient states she stated with SOB @ 0200 then today felt her heart racing. EMS gave aerosol PTA. HISTORY OF PRESENT ILLNESS    Karyn Rajput is a 67 y.o. female who presents to the emergency department by EMS for evaluation of cute onset of shortness of breath. Patient does have a history of asthma and COPD and has been using her inhaler at least 4 times a day for the last several days. She is afebrile nontoxic looking she does have a cough which she says is productive of clear sputum. She denies chest pain. REVIEW OF SYSTEMS       Review of Systems   Constitutional: Negative. HENT: Negative. Eyes: Negative. Respiratory:  Positive for shortness of breath and wheezing. Cardiovascular: Negative. Gastrointestinal: Negative. Endocrine: Negative. Genitourinary: Negative. Musculoskeletal: Negative. Skin: Negative. Allergic/Immunologic: Negative. Hematological: Negative. Psychiatric/Behavioral: Negative. PAST MEDICAL HISTORY    has a past medical history of Asthma, COPD (chronic obstructive pulmonary disease) ([address] Dyspnea, Fibromyalgia, Herniated disc, cervical, Herniated lumbar intervertebral disc, and Kidney disease. SURGICAL HISTORY      has a past surgical history that includes Hysterectomy; Dental surgery; and Holter Monitor 48 Hour (10/11/2021).     CURRENT MEDICATIONS       Previous Medications    ALBUTEROL (PROVENTIL) (2.5 MG/3ML) 0.083% NEBULIZER SOLUTION    Take 3 mLs by nebulization every 6 hours as needed for Wheezing    ALBUTEROL SULFATE HFA (VENTOLIN HFA) 108 (90 BASE) MCG/ACT INHALER    Inhale 2 puffs into the lungs every 4 hours as needed for Wheezing     BUDESONIDE-FORMOTEROL (SYMBICORT) 160-4.5 MCG/ACT AERO Inhale 2 puffs into the lungs 2 times daily    LACTULOSE (CHRONULAC) 10 GM/15ML SOLUTION    Take 15 mLs by mouth daily as needed (constipation)    METOPROLOL SUCCINATE (TOPROL XL) 25 MG EXTENDED RELEASE TABLET    Take 0.5 tablets by mouth daily    MONTELUKAST (SINGULAIR) 10 MG TABLET    Take 1 tablet by mouth nightly       ALLERGIES     is allergic to oxycodone-acetaminophen, penicillin g sodium, and penicillins. FAMILY HISTORY     She indicated that the status of her mother is unknown. She indicated that the status of her father is unknown. She indicated that the status of her maternal aunt is unknown.     family history includes Breast Cancer in her maternal aunt; Diabetes in her mother; Heart Disease in her father. SOCIAL HISTORY      reports that she quit smoking about 8 years ago. She has a 30.00 pack-year smoking history. She has never used smokeless tobacco. She reports current alcohol use of about 3.0 standard drinks per week. She reports that she does not use drugs. PHYSICAL EXAM     INITIAL VITALS:  height is 5' 4\" (1.626 m) and weight is 49.4 kg (109 lb). Her oral temperature is 98.8 °F (37.1 °C). Her blood pressure is 168/86 (abnormal) and her pulse is 106 (abnormal). Her respiration is 24 and oxygen saturation is 96%. Constitutional: Alert, oriented x3, nontoxic, afebrile, answering questions appropriately, acting properly for age, in no acute distress  HEENT: Extraocular muscles intact, mucus membranes moist, TMs clear bilaterally, no posterior pharyngeal erythema or exudates, Pupils equal, round, reactive to light,   Neck: Trachea midline, Supple without lymphadenopathy, no posterior midline neck tenderness to palpation  Cardiovascular: Regular rhythm and rate no S3, S4, or murmurs  Respiratory: Expiratory wheezes noted throughout, no rales are noted. Gastrointestinal: Soft, nontender, nondistended, positive bowel sounds. No rebound, rigidity, or guarding.   Musculoskeletal: No extremity pain or swelling  Neurologic: Moving all 4 extremities without difficulty there are no gross focal neurologic deficits  Skin: Warm and dry    DIFFERENTIAL DIAGNOSIS/ MDM:   Acute exacerbation of COPD versus intrinsic lung disease. Patient will get an x-ray she will get some treatments and then reevaluated we will also do an EKG. DIAGNOSTIC RESULTS     EKG: All EKG's are interpreted by the Emergency Department Physician who either signs or Co-signs this chart in the absence of a cardiologist.    EKG is a sinus tachycardia at 108 beats a minute, NH interval point 162, QRS duration point 066, QTc 463 ms no ST or T wave changes indicative any ongoing ischemia. Not indicated unless otherwise documented above    LABS:  Results for orders placed or performed during the hospital encounter of 10/06/22   Troponin   Result Value Ref Range    Troponin, High Sensitivity 34 (H) 0 - 14 ng/L       Not indicated unless otherwise documented above    RADIOLOGY:   I reviewed the radiologist interpretations:  XR CHEST PORTABLE   Final Result   1. No acute cardiopulmonary abnormality. 2. Hyperinflated lungs, consistent with COPD.              Not indicated unless otherwise documented above    EMERGENCY DEPARTMENT COURSE:     The patient was given the following medications:  Orders Placed This Encounter   Medications    methylPREDNISolone sodium (SOLU-MEDROL) injection 125 mg    albuterol (PROVENTIL) nebulizer solution 2.5 mg     Order Specific Question:   Initiate RT Bronchodilator Protocol     Answer:   No        Vitals:    Vitals:    10/06/22 2024 10/06/22 2030 10/06/22 2045 10/06/22 2100   BP: (!) 199/92 (!) 177/91  (!) 168/86   Pulse: (!) 117 (!) 104 (!) 106 (!) 106   Resp: 24      Temp: 98.8 °F (37.1 °C)      TempSrc: Oral      SpO2: (S) 93% 99% 96% 96%   Weight: 49.4 kg (109 lb)      Height: 5' 4\" (1.626 m)        -------------------------  BP (!) 168/86   Pulse (!) 106   Temp 98.8 °F (37.1 °C) (Oral)   Resp 24   Ht 5' 4\" (1.626 m)   Wt 49.4 kg (109 lb)   LMP  (LMP Unknown)   SpO2 96%   BMI 18.71 kg/m²         I have reviewed the disposition diagnosis with the patient and or their family/guardian. I have answered their questions and given discharge instructions. They voiced understanding of these instructions and did not have any further questions or complaints. CRITICAL CARE:    None    CONSULTS:    None    PROCEDURES:    None      OARRS Report if indicated             FINAL IMPRESSION      1. Moderate asthma with exacerbation, unspecified whether persistent          DISPOSITION/PLAN   DISPOSITION Decision To Discharge    I have reviewed the disposition diagnosis with the patient and or their family/guardian. I have answered their questions and given discharge instructions. They voiced understanding of these instructions and did not have any further questions or complaints. Reevaluation: Patient has improved symptomatically since has been here in the emergency department as she has gotten 2 treatments plus steroids. Her chest x-ray is negative. She is feeling better and feels like she can go home. We are advising her to see her own doctors tomorrow if possible. 10:50 PM  Initially we are attempting to send this patient home as she was doing better after treatments however when she got outside waiting for her She got up to walk to the cabin Got extremely short of breath we brought her back into her room air saturations were close to 87% which came up with oxygen however she needs the oxygen and needs an admission to the hospital.  We did speak to Kiarra Daniels for that admission. PATIENT REFERRED TO:  DIONE Peter CNP  [address]  [facility]  [phone]    In 1 day      DISCHARGE MEDICATIONS:  New Prescriptions    No medications on file       (Please note that portions of this note were completed with a voice recognition program.  Efforts were made to edit the dictations but occasionally words are mis-transcribed.)    Mary Wright MD,  Attending Emergency Physician           Mary Wright MD  10/06/22 2130       Mary Wright MD  10/06/22 0223

## 2022-10-07 NOTE — ED NOTES
Patient waiting for Cab and then walked to the CAb and again became Short of Breathe. Returned to room 7 per wheelchair.       Erasmo Peralta RN  10/06/22 1930

## 2022-10-07 NOTE — PROGRESS NOTES
Occupational Therapy  Facility/Department: [facility]  Occupational Therapy Initial Assessment      Name: Mihaela Rivero  : 1950  MRN: [id number]  Date of Service: 10/7/2022    Chief Complaint   Patient presents with    Shortness of Breath    Wheezing     Patient states she stated with SOB @ 0200 then today felt her heart racing. EMS gave aerosol PTA. Discharge Recommendations:  Patient would benefit from continued therapy after discharge     Patient Diagnosis(es): The encounter diagnosis was Moderate asthma with exacerbation, unspecified whether persistent. Past Medical History:  has a past medical history of Asthma, COPD (chronic obstructive pulmonary disease) ([facility] [address] Dyspnea, Fibromyalgia, Herniated disc, cervical, Herniated lumbar intervertebral disc, and Kidney disease. Past Surgical History:  has a past surgical history that includes Hysterectomy; Dental surgery; and Holter Monitor 48 Hour (10/11/2021). Assessment   Performance deficits / Impairments: Decreased functional mobility ; Decreased endurance;Decreased coordination;Decreased ADL status; Decreased balance  Assessment: Pt is very motivated to regain functional independence with daily tasks and overall endurance. Pt currently has deficits / impairments which impact her ability to return to prior level of functioning; will benefit from continued participation in OT services to improve independent skills / functions. Prognosis: Fair  Decision Making: Medium Complexity  REQUIRES OT FOLLOW-UP: Yes  Activity Tolerance  Activity Tolerance: Patient Tolerated treatment well;Patient limited by fatigue;Patient limited by pain  Activity Tolerance Comments: Desaturation of SpO2 with minimal / little activity.           Plan   Occupational Therapy Plan  Times Per Week: 5-6x/week  Current Treatment Recommendations: Strengthening, Balance training, Functional mobility training, Endurance training, Safety education & training, Patient/Caregiver education & training, Equipment evaluation, education, & procurement, Home management training, Self-Care / ADL, Cognitive/Perceptual training       Restrictions  Restrictions/Precautions  Restrictions/Precautions: Fall Risk, Up as Tolerated  Required Braces or Orthoses?: No  Position Activity Restriction  Other position/activity restrictions: 2L O2 via NC at all times - monitor SpO2      Subjective   General  Patient assessed for rehabilitation services?: Yes  Family / Caregiver Present: No  Diagnosis: COPD Exacerbation  Subjective  Subjective: RN approved Pt to be seen for OT Evaluation. General Comment  Comments: Pt was agreeable / cooperative. She verbalized high motivation to improve functionaly and rreturn to PLOF. Pt reported she has increased anxiety when she gets SOB.       Social/Functional History  Social/Functional History  Lives With: Alone  Type of Home: Apartment  Home Layout: One level  Home Access: Stairs to enter with rails  Entrance Stairs - Number of Steps: 12  Entrance Stairs - Rails: Left  Bathroom Shower/Tub: Tub/Shower unit  Bathroom Toilet: Standard (Sink close by if needed)  Bathroom Equipment: Shower chair, Grab bars in shower  Home Equipment:  (No DME)  ADL Assistance: Independent (Pt reports difficulty/increased time with bathing secondary to breathing deficits- notes she has to perform this way to ensure her breathing remains under control.)  Homemaking Assistance: Independent  Homemaking Responsibilities: Yes  Meal Prep Responsibility: Primary  Laundry Responsibility: Primary  Cleaning Responsibility: Primary  Shopping Responsibility: Primary  Ambulation Assistance: Independent  Transfer Assistance: Independent  Active : Yes  Mode of Transportation: Car  Occupation: Retired  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[address]: Enjoys roller blading, bowling, tennis- but unable to perform any longer; now enjoys crossword puzzles      Objective   O2 Device: Nasal cannula  Comment: 2L NC at all times. SpO2 at rest 90-94%. SpO2 with light activity 88-90%. SpO2 with heavy activity (including bending / reaching for ADL participation): 87-88%. Pt able to resaturate with long rest breaks and demo / instruction (mod cues) for integration of diaphragmatic breathing techniques and Ax pacing. Safety Devices  Type of Devices: Call light within reach;Nurse notified; All fall risk precautions in place;Gait belt;Left in bed  Restraints  Restraints Initially in Place: No    Bed Mobility Training  Bed Mobility Training: Yes  Overall Level of Assistance: Supervision; Adaptive equipment (HOB elevated ~30, Bedrail (Right))  Interventions: Verbal cues; Visual cues (Mod Cues for task initiation/sequencing & integration of EC/Ax pacing)  Supine to Sit: Supervision; Adaptive equipment  Sit to Supine: Supervision; Adaptive equipment  Scooting: Supervision; Adaptive equipment    Balance  Sitting: Intact  Standing: With support  Transfer Training  Transfer Training: Yes  Overall Level of Assistance:  (SBA)  Interventions: Visual cues; Verbal cues; Safety awareness training  Sit to Stand: Stand-by assistance (No use of AD / DME)  Stand to Sit: Stand-by assistance  Stand Pivot Transfers: Stand-by assistance  Toilet Transfer: Stand-by assistance;Supervision; Adaptive equipment (Standard toilet with Grab bar (Left). SBA progressing to Supervision Assist.)    Gait  Overall Level of Assistance: Stand-by assistance (Functional Mobility / Gerline Chatters / In-bathroom negotiation without DME / AD. SBA.)  Interventions: Visual cues; Verbal cues; Safety awareness training (Mod Cues for task initiation/sequencing & integration of EC/Ax pacing)    AROM: Within functional limits  Strength: Generally decreased, functional  Coordination: Generally decreased, functional    ADL  Feeding: Independent  Grooming: Supervision;Verbal cueing  Grooming Skilled Clinical Factors: Dynamic standing at the sink (Supervision Assist, without DME, with UE support) while participating in Hand / Face Hygiene. UE Bathing: Stand by assistance; Increased time to complete  UE Bathing Skilled Clinical Factors: Based on clinical assessment of skills. LE Bathing: Stand by assistance; Increased time to complete  LE Bathing Skilled Clinical Factors: Based on clinical assessment of skills. UE Dressing: Supervision;Verbal cueing  UE Dressing Skilled Clinical Factors: Seated at EOB, Supervision Assist to doff / redon gown. Verbal / Demonstrative cues to integrate EC / Ax pacing. LE Dressing: Stand by assistance;Verbal cueing  LE Dressing Skilled Clinical Factors: Sitting EOB, SBA to doff / don Bilateral socks. Toileting: Stand by assistance;Supervision; Adaptive equipment; Increased time to complete  Toileting Skilled Clinical Factors: At standard toilet with Grab bar (left). Additional Comments: Pt demo'd impulsivity and rushing with all activities. She required Mod Cues / Reminders to integrate Ax Pacing / EC and Diaphragmatic Breathing during functional tasks / mobility / ADLs. Pt also required intermittent seated reest breaks for recovery of SpO2. Activity Tolerance  Activity Tolerance: Patient limited by endurance; Patient tolerated treatment well    Vision  Vision: Impaired  Vision Exceptions: Wears glasses for reading  Hearing  Hearing: Within functional limits    Cognition  Overall Cognitive Status: [facility]  Attention Span: Attends with cues to redirect; Difficulty dividing attention  Problem Solving: Decreased awareness of errors;Assistance required to correct errors made;Assistance required to identify errors made  Insights: Decreased awareness of deficits  Initiation: Requires cues for some  Sequencing: Requires cues for some  Cognition Comment: Pt rushes through all activities, which requires consistent Mod Cues / Pecola Back / Instruction to slow pace (overall), integrate EC, and integrate diaphragmatic breathing techniques.   She was easily distracted and talkative as well. Orientation  Overall Orientation Status: Within Functional Limits    Education Given To: Patient  Education Provided: Role of Therapy;Plan of Care;Precautions; Equipment;ADL Adaptive Strategies;Transfer Training; Fall Prevention Strategies; Energy Conservation  Education Method: Demonstration;Verbal  Barriers to Learning: None  Education Outcome: Verbalized understanding;Demonstrated understanding      AM-PAC Score  AM-PAC Inpatient Daily Activity Raw Score: 20 (10/07/22 1651)  AM-PAC Inpatient ADL T-Scale Score : 42.03 (10/07/22 1651)  ADL Inpatient CMS 0-100% Score: 38.32 (10/07/22 1651)  ADL Inpatient CMS G-Code Modifier : CJ (10/07/22 1651)      Goals  Short Term Goals  Time Frame for Short Term Goals: Within 14 treatment sessions  Short Term Goal 1: Pt will demo Mod I and Good Participation with progressive HEP (BUE Strength / Grasp). Short Term Goal 2: Pt will participate in UB / LB ADLs with Mod I with Good Integration of EC / Ax pacing. Short Term Goal 3: Pt will maintain Fair+ Balance during completion of ADL / Bathroom Transfers and Mobility. Short Term Goal 4: Pt will participate in Functional Mobility (with item retrieval / transport) with Mod I while maintaining SpO2 >92% consistently.        Therapy Time   Individual Concurrent Group Co-treatment   Time In 1419         Time Out 1456         Minutes 37         Timed Code Treatment Minutes: 26 Minutes (ADL + TherAct)      GODFREY Newby, OTR/L

## 2022-10-07 NOTE — PROGRESS NOTES
Physical Therapy  Facility/Department: [facility] MED SURG ICU  Physical Therapy Initial Assessment    Name: Viviana Monet  : 1950  MRN: [id number]  Date of Service: 10/7/2022  Chief Complaint   Patient presents with    Shortness of Breath    Wheezing     Patient states she stated with SOB @ 0200 then today felt her heart racing. EMS gave aerosol PTA. Discharge Recommendations:  No therapy recommended at discharge   PT Equipment Recommendations  Equipment Needed: No      Patient Diagnosis(es): The encounter diagnosis was Moderate asthma with exacerbation, unspecified whether persistent. Past Medical History:  has a past medical history of Asthma, COPD (chronic obstructive pulmonary disease) ([facility] [address] Dyspnea, Fibromyalgia, Herniated disc, cervical, Herniated lumbar intervertebral disc, and Kidney disease. Past Surgical History:  has a past surgical history that includes Hysterectomy; Dental surgery; and Holter Monitor 48 Hour (10/11/2021). Assessment   Body Structures, Functions, Activity Limitations Requiring Skilled Therapeutic Intervention: Decreased functional mobility ; Decreased endurance  Assessment: Pt is most limited by her endurance deficits this date- able to demonstrate ability to ambulate household distances SBA. Pt without further therapy needs upon discharge. Educated on enegery conservation techniques. Therapy Prognosis: Good  Decision Making: Medium Complexity  Requires PT Follow-Up: Yes  Activity Tolerance  Activity Tolerance: Patient limited by endurance; Patient tolerated treatment well     Plan   Physcial Therapy Plan  General Plan:  (4-5x)  Current Treatment Recommendations: Strengthening, Balance training, Functional mobility training, Transfer training, Endurance training, Gait training, Stair training, Home exercise program, Patient/Caregiver education & training, Therapeutic activities  Safety Devices  Type of Devices: Call light within reach, Left in chair, Nurse notified  Restraints  Restraints Initially in Place: No     Restrictions  Restrictions/Precautions  Restrictions/Precautions: Fall Risk, Up as Tolerated  Required Braces or Orthoses?: No     Subjective   General  Patient assessed for rehabilitation services?: Yes  Response To Previous Treatment: Not applicable  Family / Caregiver Present: No  Follows Commands: Within Functional Limits  Subjective  Subjective: Pt seated in chair upon arrival and agreeable to therapy. Pt denies any pain. RN agreeable to therapy.          Social/Functional History  Social/Functional History  Lives With: Alone  Type of Home: Apartment  Home Layout: One level  Home Access: Stairs to enter with rails  Entrance Stairs - Number of Steps: 12  Entrance Stairs - Rails: Left  Bathroom Shower/Tub: Tub/Shower unit  Bathroom Toilet: Standard  Bathroom Equipment: Shower chair, Grab bars in shower  Home Equipment:  (No DME)  ADL Assistance: Independent  Homemaking Assistance: Independent  Homemaking Responsibilities: Yes  Meal Prep Responsibility: Primary  Laundry Responsibility: Primary  Cleaning Responsibility: Primary  Shopping Responsibility: Primary  Ambulation Assistance: Independent  Transfer Assistance: Independent  Active : Yes  Mode of Transportation: Car  Occupation: Retired  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[address]: Enjoys roller blading, bowling, tennis- but unable to perform any longer; now enjoys crossword puzzles    Vision/Hearing  Vision  Vision: Impaired  Vision Exceptions: Wears glasses for reading  Hearing  Hearing: Within functional limits      Cognition   Orientation  Overall Orientation Status: Within Functional Limits  Cognition  Overall Cognitive Status: WFL  Problem Solving: Assistance required to correct errors made     Objective      Gross Assessment  Sensation: Intact (Pt denies any numbness or tingling)     AROM RLE (degrees)  RLE AROM: WFL  AROM LLE (degrees)  LLE AROM : WFL  AROM RUE (degrees)  RUE AROM : WFL  AROM LUE (degrees)  LUE AROM : WFL  Strength RLE  Comment: grossly 4+/5  Strength LLE  Comment: grossly 4+/5  Strength RUE  Comment: grossly 4+/5  Strength LUE  Comment: grossly 4+/5           Bed mobility  Scooting: Supervision  Bed Mobility Comments: Pt up to chair upon arrival and retired to chair at end of session. Transfers  Sit to Stand: Supervision  Stand to Sit: Supervision  Ambulation  Surface: Level tile  Device: No Device  Other Apparatus: O2 (2L)  Assistance: Stand by assistance  Quality of Gait: decreased endurance- oxygen saturation decreased to 91% during ambulation while on 2L; educated pt to focus on breathing as she is quite verbose throughout session with her reports of feeling SOB  Distance: 65ft within room  More Ambulation?: No  Stairs/Curb  Stairs?: No     Balance  Posture: Good  Sitting - Static: Good  Sitting - Dynamic: Good  Standing - Static: Good;-  Standing - Dynamic: Good;-  Comments: standing balance assessed without device    Significant amount of time spent educating pt on importance of pacing self with mobility. Adapting activities to allow for ease of breathing with tasks. Educated on importance of breathing techniques and focus on breathing during activities. Discussed which activities are [employer]" activities than others and on breaking up such activities to allow increased time between, etc. Pt receptive to education and discussed how roles could be implemented in her home setting.             AM-PAC Score  AM-PAC Inpatient Mobility Raw Score : 20 (10/07/22 1327)  AM-PAC Inpatient T-Scale Score : 47.67 (10/07/22 1327)  Mobility Inpatient CMS 0-100% Score: 35.83 (10/07/22 1327)  Mobility Inpatient CMS G-Code Modifier : CJ (10/07/22 1327)          Goals  Short Term Goals  Time Frame for Short Term Goals: 14 visits  Short Term Goal 1: Pt to ambulate 150ft independently without device  Short Term Goal 2: Pt to sit<>stand transfer independently  Short Term Goal 3: Pt to demonstrate independent bed mobility  Short Term Goal 4: Pt to tolerate 30 minutes of therapy for endurance  Short Term Goal 5: Pt to demonstrate good + standing balance to decrease risk of falls       Education  Patient Education  Education Given To: Patient  Education Provided: Role of Therapy;Plan of Care;Energy Conservation  Education Provided Comments: Energy conservation with mobility  Education Method: Verbal  Barriers to Learning: None  Education Outcome: Verbalized understanding      Therapy Time   Individual Concurrent Group Co-treatment   Time In 1028         Time Out 1102         Minutes 34         Timed Code Treatment Minutes: [address] Lenka Rodriguez, PT

## 2022-10-07 NOTE — CARE COORDINATION
Patient resting in bed on nasal oxygen at 2.0 lpm and able to speak in full sentences. Patient states she gets short of breath with exertion. Re-enforced pursed lip breathing with exercise / activity of daily living/ anxiety producing situations. Patient denies home oxygen usage. Patient denies use of home BIPAP or CPAP usage. Encouraged Pulmonary  Rehab group class     Reviewed chest xray and lab results. Patient takes respiratory treatments at home. Respiratory treatments have already been ordered.      Randa Terrell MBA, RRT

## 2022-10-07 NOTE — PROGRESS NOTES
RN contacted on call NP to notify of pt admission and B/P 174/95 . New orders for 5 mg metoprolol IVP. Pt also medicated with 650mg tylenol for headache.     0145 B/P 157/9, HR 88. Notified on call NP of elevated B/P and headache persisting.  Awaiting response

## 2022-10-07 NOTE — CARE COORDINATION
10/07/22 1134   Service Assessment   Patient Orientation Alert and Oriented   Cognition Alert   History Provided By Patient   Primary [address] Family Members   Patient's Healthcare Decision Maker is: Patient Declined (Legal Next of Kin Remains as Decision Maker)   PCP Verified by CM Yes   Last Visit to PCP Within last 6 months   Prior Functional Level Independent in ADLs/IADLs   Current Functional Level Independent in ADLs/IADLs   Can patient return to prior living arrangement Yes   Social/Functional History   Lives With Alone   Type of [address] One level   Home Access Stairs to enter with rails   Entrance Stairs - Number of Steps 12   Entrance Stairs - Rails Left   Bathroom Shower/Tub Tub/Shower unit   Bathroom Toilet Standard   Bathroom Equipment Shower chair;Grab bars in shower   ADL Assistance Independent   Homemaking Assistance Independent   Homemaking Responsibilities Yes   Ambulation Assistance Independent   Transfer Assistance Independent   Active  Yes   Mode of Transportation Car   Occupation Retired   Type of Occupation Cleaned at [employer]   Patient expects to be discharged to: [facility] Discharge   Mode of Transport at Discharge Self   Confirm Follow Up Transport Family   Condition of Participation: Discharge Planning   The Patient and/or Patient Representative was provided with a Choice of Provider? Patient   The Patient and/Or Patient Representative agree with the Discharge Plan?  Yes       D/c home independent, monitor for home O2 needs [facility]- Pediatric Dermatology  Dr. Eli Bonilla, Dr. Zelda Covarrubias, Dr. Yolanda Anderson, Dr. Arielle Bailey, TAYL[address] Dr. Mine Figueredo    Non Urgent  Nurse Triage Line; [phone]- Chrissy and Sary MCBRIDE Care Coordinators    Macy (/Complex ) [phone]    If you need a prescription refill, please contact your pharmacy. Refills are approved or denied by our Physicians during normal business hours, Monday through Fridays  Per office policy, refills will not be granted if you have not been seen within the past year (or sooner depending on your child's condition)      Scheduling Information:   Pediatric Appointment Scheduling and Call Center [phone]   Radiology Scheduling- [phone]   Sedation Unit Scheduling- [phone]  Main  Services: [phone]   Maori: [phone]   Solomon Islander: [phone]   Hmong/Grenadian/[place]ern[place]: [phone]    Preadmission Nursing Department Fax Number: [phone] (Fax all pre-operative paperwork to this number)      For urgent matters arising during evenings, weekends, or holidays that cannot wait for normal business hours please call [phone] and ask for the Dermatology Resident On-Call to be paged.

## 2022-10-07 NOTE — H&P
[facility]  Office: [address], DO, Rodriguez Kenia, DO, Rual Settle, DO, Juancho Acostaro Blood, DO, Awais Marino MD, Johann Riedel, MD, Augustin Apple MD, Colin Alas MD,  Mahogany Daley MD, Kellen Ruiz MD, Dania Dickens, DO, Anjum Ireland MD,  Rafa Granado MD, Annmarie Huddleston MD, Patti Stratton, DO, David Silva MD, Grzegorz Cole MD, Jewels Treviño MD, Bev Garrett MD, Tee Aquino MD, Leroy Flores MD, Anna Ivey, DO, Caty Nuñez MD, Josephine Gaytan MD, Katie Stevens, [facility],  Jane Bo, [facility], Lanre, CNP, Sudeep Nowak, CNP,  Katina De La Fuente, [facility], Katarina Person, CNP, Nixon Wright, CNP, Ange Damian, CNP, Aston Polanco, CNP, Khris Faulkner, CNP, Kris Medina PA-C, Tom Mares, CNS, Krystal Gonzalez, [facility], Gerda Nicole, CNP, Mara Neal, CNP, Alex Neal, [address]    HISTORY AND PHYSICAL EXAMINATION            Date:   10/7/2022  Patient name:  Chasity Kumar  Date of admission:  10/6/2022  8:01 PM  MRN:   [id number]  Account:  [de-identified]  YOB: 1950  PCP:    DIONE Roca CNP  Room:   [address] [place] [address]  Code Status:    Full Code    Chief Complaint:     Chief Complaint   Patient presents with    Shortness of Breath    Wheezing     Patient states she stated with SOB @ 0200 then today felt her heart racing. EMS gave aerosol PTA. History Obtained From:     patient    History of Present Illness:     Chasity Kumar is a 67 y.o. female with a past medical history of hypertension and COPD (not on home O2) who presented to the emergency department on 10/6/2022 complaining of shortness of breath and wheezing. The patient states that her symptoms began yesterday morning and have progressively worsened. In the ED, the patient was hypoxic, tachycardic and uncomfortable appearing. Chest x-ray was negative for infiltrate.  The patient is Age of Onset    Diabetes Mother     Heart Disease Father     Breast Cancer Maternal Aunt        Review of Systems:     Positive and Negative as described in HPI. CONSTITUTIONAL:  negative for fevers, chills, sweats, fatigue, weight loss  HEENT:  negative for vision, hearing changes, runny nose, throat pain  RESPIRATORY:  Positive for shortness of breath, cough and wheezing. CARDIOVASCULAR:  negative for chest pain, palpitations  GASTROINTESTINAL:  negative for nausea, vomiting, diarrhea, constipation, change in bowel habits, abdominal pain   GENITOURINARY:  negative for difficulty of urination, burning with urination, frequency   INTEGUMENT:  negative for rash, skin lesions, easy bruising   HEMATOLOGIC/LYMPHATIC:  negative for swelling/edema   ALLERGIC/IMMUNOLOGIC:  negative for urticaria , itching  ENDOCRINE:  negative increase in drinking, increase in urination, hot or cold intolerance  MUSCULOSKELETAL:  negative joint pains, muscle aches, swelling of joints  NEUROLOGICAL:  negative for headaches, dizziness, lightheadedness, numbness, pain, tingling extremities  BEHAVIOR/PSYCH:  negative for depression, anxiety    Physical Exam:   BP (!) 146/82   Pulse (!) 107   Temp 97.9 °F (36.6 °C) (Oral)   Resp 22   Ht 5' 4\" (1.626 m)   Wt 121 lb 4.1 oz (55 kg)   LMP  (LMP Unknown)   SpO2 95%   BMI 20.81 kg/m²   Temp (24hrs), Av.1 °F (30.6 °C), Min:32 °F (0 °C), Max:98.8 °F (37.1 °C)    No results for input(s): POCGLU in the last 72 hours.   No intake or output data in the 24 hours ending 10/07/22 1008    General Appearance:  alert, well appearing, and in no acute distress  Mental status: oriented to person, place, and time  Head:  normocephalic, atraumatic  Eye: no icterus, redness, pupils equal and reactive, extraocular eye movements intact, conjunctiva clear  Ear: normal external ear, no discharge, hearing intact  Nose:  no drainage noted  Mouth: mucous membranes moist  Neck: supple, no carotid bruits, thyroid not palpable  Lungs: Pronounced wheezes present in all lung fields. Cardiovascular: normal rate, regular rhythm, no murmur, gallop, rub  Abdomen: Soft, nontender, nondistended, normal bowel sounds, no hepatomegaly or splenomegaly  Neurologic: There are no new focal motor or sensory deficits, normal muscle tone and bulk, no abnormal sensation, normal speech, cranial nerves II through XII grossly intact  Skin: No gross lesions, rashes, bruising or bleeding on exposed skin area  Extremities:  peripheral pulses palpable, no pedal edema or calf pain with palpation  Psych: normal affect     Investigations:      Laboratory Testing:  Recent Results (from the past 24 hour(s))   Troponin    Collection Time: 10/06/22  8:15 PM   Result Value Ref Range    Troponin, High Sensitivity 34 (H) 0 - 14 ng/L   COVID-19, Rapid    Collection Time: 10/06/22 10:50 PM    Specimen: Nasopharyngeal Swab   Result Value Ref Range    Specimen Description . NASOPHARYNGEAL SWAB     SARS-CoV-2, Rapid Not Detected Not Detected   Basic Metabolic Panel w/ Reflex to MG    Collection Time: 10/07/22  4:16 AM   Result Value Ref Range    Glucose 170 (H) 70 - 99 mg/dL    BUN 20 8 - 23 mg/dL    Creatinine 0.99 (H) 0.50 - 0.90 mg/dL    Est, Glom Filt Rate >60 >60 mL/min/1.73m2    Calcium 9.2 8.6 - 10.4 mg/dL    Sodium 136 135 - 144 mmol/L    Potassium 5.0 3.7 - 5.3 mmol/L    Chloride 101 98 - 107 mmol/L    CO2 23 20 - 31 mmol/L    Anion Gap 12 9 - 17 mmol/L       Imaging/Diagnostics:  XR CHEST PORTABLE    Result Date: 10/7/2022  Unchanged appearance of the chest without acute airspace disease identified. XR CHEST PORTABLE    Result Date: 10/6/2022  1. No acute cardiopulmonary abnormality. 2. Hyperinflated lungs, consistent with COPD.        Assessment :      Hospital Problems             Last Modified POA    * (Principal) COPD exacerbation ([address] 10/6/2022 Yes    Essential hypertension 10/7/2022 Yes       Plan:     Patient status inpatient in the Med/Surge    COPD  -Currently in acute exacerbation   -Methylprednisolone 40 mg q12h   -Q4H Duonebs   -Azithromycin 500 mg daily x 3 days   -Wean supplemental oxygen as tolerated     HTN   -Continue home metoprolol     Consultations:   None    Patient is admitted as inpatient status because of co-morbidities listed above, severity of signs and symptoms as outlined, requirement for current medical therapies and most importantly because of direct risk to patient if care not provided in a hospital setting. Expected length of stay > 48 hours.     Gray Brady MD  10/7/2022  10:08 AM    Copy sent to Dr. Henry Palumbo, APRN - CNP

## 2022-10-07 NOTE — FLOWSHEET NOTE
10/07/22 0058   Nursing Delirium Screening Scale (Nu-DESC)   Disorientation 0   Innappropriate Behavior 0   Innappropriate Communication 0   Illusions/Hallucinations 0   Psychomotor Retardation 0   Nu-DESC Score (calculated) 0

## 2022-10-08 LAB
EKG ATRIAL RATE: 108 BPM
EKG P AXIS: 87 DEGREES
EKG P-R INTERVAL: 162 MS
EKG Q-T INTERVAL: 346 MS
EKG QRS DURATION: 66 MS
EKG QTC CALCULATION (BAZETT): 463 MS
EKG R AXIS: 20 DEGREES
EKG T AXIS: 77 DEGREES
EKG VENTRICULAR RATE: 108 BPM

## 2022-10-08 PROCEDURE — 94760 N-INVAS EAR/PLS OXIMETRY 1: CPT

## 2022-10-08 PROCEDURE — 2580000003 HC RX 258: Performed by: NURSE PRACTITIONER

## 2022-10-08 PROCEDURE — 6370000000 HC RX 637 (ALT 250 FOR IP): Performed by: HOSPITALIST

## 2022-10-08 PROCEDURE — 6370000000 HC RX 637 (ALT 250 FOR IP): Performed by: NURSE PRACTITIONER

## 2022-10-08 PROCEDURE — 99232 SBSQ HOSP IP/OBS MODERATE 35: CPT | Performed by: HOSPITALIST

## 2022-10-08 PROCEDURE — 6370000000 HC RX 637 (ALT 250 FOR IP): Performed by: STUDENT IN AN ORGANIZED HEALTH CARE EDUCATION/TRAINING PROGRAM

## 2022-10-08 PROCEDURE — 6360000002 HC RX W HCPCS: Performed by: HOSPITALIST

## 2022-10-08 PROCEDURE — 2700000000 HC OXYGEN THERAPY PER DAY

## 2022-10-08 PROCEDURE — 97116 GAIT TRAINING THERAPY: CPT

## 2022-10-08 PROCEDURE — 94640 AIRWAY INHALATION TREATMENT: CPT

## 2022-10-08 PROCEDURE — 6360000002 HC RX W HCPCS: Performed by: NURSE PRACTITIONER

## 2022-10-08 PROCEDURE — 2060000000 HC ICU INTERMEDIATE R&B

## 2022-10-08 PROCEDURE — 94664 DEMO&/EVAL PT USE INHALER: CPT

## 2022-10-08 PROCEDURE — 6360000002 HC RX W HCPCS: Performed by: STUDENT IN AN ORGANIZED HEALTH CARE EDUCATION/TRAINING PROGRAM

## 2022-10-08 RX ORDER — GUAIFENESIN 600 MG/1
600 TABLET, EXTENDED RELEASE ORAL 2 TIMES DAILY
Status: DISCONTINUED | OUTPATIENT
Start: 2022-10-08 | End: 2022-10-09 | Stop reason: HOSPADM

## 2022-10-08 RX ORDER — HYDRALAZINE HYDROCHLORIDE 20 MG/ML
10 INJECTION INTRAMUSCULAR; INTRAVENOUS EVERY 6 HOURS PRN
Status: DISCONTINUED | OUTPATIENT
Start: 2022-10-08 | End: 2022-10-09 | Stop reason: HOSPADM

## 2022-10-08 RX ORDER — GUAIFENESIN DEXTROMETHORPHAN HYDROBROMIDE ORAL SOLUTION 10; 100 MG/5ML; MG/5ML
10 SOLUTION ORAL EVERY 4 HOURS PRN
Status: DISCONTINUED | OUTPATIENT
Start: 2022-10-08 | End: 2022-10-09 | Stop reason: HOSPADM

## 2022-10-08 RX ORDER — BENZONATATE 100 MG/1
200 CAPSULE ORAL 3 TIMES DAILY
Status: DISCONTINUED | OUTPATIENT
Start: 2022-10-08 | End: 2022-10-09 | Stop reason: HOSPADM

## 2022-10-08 RX ORDER — BUDESONIDE AND FORMOTEROL FUMARATE DIHYDRATE 160; 4.5 UG/1; UG/1
2 AEROSOL RESPIRATORY (INHALATION) 2 TIMES DAILY
Status: DISCONTINUED | OUTPATIENT
Start: 2022-10-08 | End: 2022-10-09 | Stop reason: HOSPADM

## 2022-10-08 RX ADMIN — IPRATROPIUM BROMIDE AND ALBUTEROL SULFATE 1 AMPULE: .5; 3 SOLUTION RESPIRATORY (INHALATION) at 07:36

## 2022-10-08 RX ADMIN — BENZONATATE 200 MG: 100 CAPSULE ORAL at 21:56

## 2022-10-08 RX ADMIN — METOPROLOL SUCCINATE 12.5 MG: 25 TABLET, EXTENDED RELEASE ORAL at 09:03

## 2022-10-08 RX ADMIN — GUAIFENESIN 600 MG: 600 TABLET, EXTENDED RELEASE ORAL at 21:56

## 2022-10-08 RX ADMIN — ENOXAPARIN SODIUM 30 MG: 100 INJECTION SUBCUTANEOUS at 09:03

## 2022-10-08 RX ADMIN — GUAIFENESIN DEXTROMETHORPHAN HYDROBROMIDE ORAL SOLUTION 10 ML: 200; 20 SOLUTION ORAL at 18:09

## 2022-10-08 RX ADMIN — METHYLPREDNISOLONE SODIUM SUCCINATE 40 MG: 40 INJECTION, POWDER, FOR SOLUTION INTRAMUSCULAR; INTRAVENOUS at 17:41

## 2022-10-08 RX ADMIN — ACETAMINOPHEN 650 MG: 325 TABLET ORAL at 17:41

## 2022-10-08 RX ADMIN — ONDANSETRON 4 MG: 2 INJECTION INTRAMUSCULAR; INTRAVENOUS at 17:41

## 2022-10-08 RX ADMIN — BENZONATATE 200 MG: 100 CAPSULE ORAL at 09:03

## 2022-10-08 RX ADMIN — SODIUM CHLORIDE, PRESERVATIVE FREE 10 ML: 5 INJECTION INTRAVENOUS at 09:05

## 2022-10-08 RX ADMIN — BENZONATATE 200 MG: 100 CAPSULE ORAL at 13:46

## 2022-10-08 RX ADMIN — IPRATROPIUM BROMIDE AND ALBUTEROL SULFATE 1 AMPULE: .5; 3 SOLUTION RESPIRATORY (INHALATION) at 15:50

## 2022-10-08 RX ADMIN — IPRATROPIUM BROMIDE AND ALBUTEROL SULFATE 1 AMPULE: .5; 3 SOLUTION RESPIRATORY (INHALATION) at 19:35

## 2022-10-08 RX ADMIN — GUAIFENESIN DEXTROMETHORPHAN HYDROBROMIDE ORAL SOLUTION 10 ML: 200; 20 SOLUTION ORAL at 21:56

## 2022-10-08 RX ADMIN — GUAIFENESIN 600 MG: 600 TABLET, EXTENDED RELEASE ORAL at 09:03

## 2022-10-08 RX ADMIN — MONTELUKAST 10 MG: 10 TABLET, FILM COATED ORAL at 21:56

## 2022-10-08 RX ADMIN — AZITHROMYCIN MONOHYDRATE 500 MG: 250 TABLET ORAL at 09:03

## 2022-10-08 RX ADMIN — IPRATROPIUM BROMIDE AND ALBUTEROL SULFATE 1 AMPULE: .5; 3 SOLUTION RESPIRATORY (INHALATION) at 12:10

## 2022-10-08 RX ADMIN — METHYLPREDNISOLONE SODIUM SUCCINATE 40 MG: 40 INJECTION, POWDER, FOR SOLUTION INTRAMUSCULAR; INTRAVENOUS at 06:33

## 2022-10-08 RX ADMIN — SODIUM CHLORIDE, PRESERVATIVE FREE 10 ML: 5 INJECTION INTRAVENOUS at 21:56

## 2022-10-08 RX ADMIN — BUDESONIDE AND FORMOTEROL FUMARATE DIHYDRATE 2 PUFF: 160; 4.5 AEROSOL RESPIRATORY (INHALATION) at 19:35

## 2022-10-08 RX ADMIN — HYDRALAZINE HYDROCHLORIDE 10 MG: 20 INJECTION INTRAMUSCULAR; INTRAVENOUS at 16:17

## 2022-10-08 ASSESSMENT — PAIN SCALES - GENERAL: PAINLEVEL_OUTOF10: 3

## 2022-10-08 NOTE — PROGRESS NOTES
Physical Therapy  Facility/Department: [facility] ICU  Physical Therapy Daily Treatment Note    Name: Cynthia Barragan  : 1950  MRN: [id number]  Date of Service: 10/8/2022    Discharge Recommendations:  No therapy recommended at discharge   PT Equipment Recommendations  Equipment Needed: No      Patient Diagnosis(es): The encounter diagnosis was Moderate asthma with exacerbation, unspecified whether persistent. Past Medical History:  has a past medical history of Asthma, COPD (chronic obstructive pulmonary disease) ([address] Dyspnea, Fibromyalgia, Herniated disc, cervical, Herniated lumbar intervertebral disc, and Kidney disease. Past Surgical History:  has a past surgical history that includes Hysterectomy; Dental surgery; and Holter Monitor 48 Hour (10/11/2021). Assessment   Body Structures, Functions, Activity Limitations Requiring Skilled Therapeutic Intervention: Decreased functional mobility ; Decreased endurance  Assessment: Pt is most limited by her endurance deficits this date- able to demonstrate ability to ambulate household distances and perform stair negotiation but exhibits PO2 desaturation on room air. Pt without further therapy needs upon discharge. Educated on energy conservation techniques.   Therapy Prognosis: Good  Requires PT Follow-Up: Yes  Activity Tolerance  Activity Tolerance: Patient limited by endurance     Plan   Physcial Therapy Plan  General Plan:  (5-6x/week)  Current Treatment Recommendations: Strengthening, Balance training, Functional mobility training, Transfer training, Endurance training, Gait training, Stair training, Home exercise program, Patient/Caregiver education & training, Therapeutic activities  Safety Devices  Type of Devices: Call light within reach, Nurse notified, Gait belt, Left in bed  Restraints  Restraints Initially in Place: No     Restrictions  Restrictions/Precautions  Restrictions/Precautions: Fall Risk, Up as Tolerated  Required Braces or Orthoses?: No  Position Activity Restriction  Other position/activity restrictions: 1 L O2 via NC; per RT patient can be removed from O2 for treatment with PO2 monitored. Subjective   General  Patient assessed for rehabilitation services?: Yes  Response To Previous Treatment: Patient with no complaints from previous session. Family / Caregiver Present: No  Follows Commands: Within Functional Limits  Subjective  Subjective: Pt seated in bed and agreeable to therapy. Pt denies pain at this time. PO2 at rest 93% on room air. Cognition   Orientation  Overall Orientation Status: Within Functional Limits  Cognition  Overall Cognitive Status: WFL     Objective      Bed mobility  Supine to Sit: Independent  Sit to Supine: Independent  Scooting: Independent  Transfers  Sit to Stand: Supervision  Stand to Sit: Supervision  Comment: Transfers without device. Ambulation  Surface: Level tile  Device: No Device  Assistance: Supervision  Quality of Gait: slow pace with verbal cues for deep breathing and to limit conversation during activity  Gait Deviations: Slow Tamera  Distance: 100' x 2  Comments: On room air, pt had a drop in O2 during ambulation and stair negotiation as low as 83% with significant time to recover to 90%; PO2 also dropped during conversation with verbal reminders given to deep breathe rather than talk. Pt with mild SOB and c/o heaviness on chest with desaturation. More Ambulation?: No  Stairs/Curb  Stairs?: Yes  Stairs  # Steps : 2  Stairs Height: 6\"  Rails: Left ascending  Device: No Device  Assistance: Supervision  Comment: Pt negotiated 2-6\" steps and 3-4\" steps x 2 reps with (L) handrail on two separate bouts with seated rest break between for O2 recovery. On room air PO2 dropped to 83-84% during stair negotiation with verbal reminders to not engage in conversation and to deep breathe.      Balance  Posture: Good  Sitting - Static: Good  Sitting - Dynamic: Good  Standing - Static: Good  Standing - Dynamic: Good;-  Comments: standing balance assessed without device                                                    AM-PAC Score  20       Goals  Short Term Goals  Time Frame for Short Term Goals: 14 visits  Short Term Goal 1: Pt to ambulate 150ft independently without device  Short Term Goal 2: Pt to sit<>stand transfer independently  Short Term Goal 3: Pt to demonstrate independent bed mobility  Short Term Goal 4: Pt to tolerate 30 minutes of therapy for endurance  Short Term Goal 5: Pt to demonstrate good + standing balance to decrease risk of falls       Education  Patient Education  Education Given To: Patient  Education Provided: Energy Conservation  Education Provided Comments: Energy conservation with mobility, deep breathing techniques, pacing  Education Method: Verbal  Barriers to Learning: None  Education Outcome: Verbalized understanding      Therapy Time   Individual Concurrent Group Co-treatment   Time In 1103         Time Out 1144         Minutes 41         Timed Code Treatment Minutes: [address] Kadie Romero PT

## 2022-10-08 NOTE — PROGRESS NOTES
[facility]  Office: [address], DO, Nayanafabien Cronin, DO, Miguel Kristine, DO, Khanh Lino, DO, Mauricio Bolton MD, Harriett Sexton MD, Simran Kovacs MD, Migel Ramirez MD,  Merari Villalta MD, Tomasz Miranda MD, Josefa Butler, DO, Anila Orellana MD,  Jatin No MD, Marino Jernigan MD, Una Tillman DO, Valeriy Benavides MD, Aníbal Lind MD, Anila Blanchard MD, Lan Tamayo MD, Zoila Carter MD, Joyce Arndt MD, Mukesh Huff DO, Gini Miller MD, Mp Celestin MD, Romayne Muscat, CNP,  Jey Garcias CNP, Lico Trejo, CNP, Felipe Gaspar, CNP,  Emerald Rodriguez DNP, Danyel Henley, CNP, Gissell Rascon CNP, Lidia Arriaga CNP, Avery Hanna CNP, Radha Yen CNP, Adán Ching PA-C, Fran Thomas, CAROLYN, Ruma Kitchen, CORNELIO, Óscar Guzmán CNP, Sharda Macias, YEN, Hunter Smith 0532    Progress Note    10/8/2022    10:16 AM    Name:   Antony Grajeda  MRN:     [id number]     Acct:      [de-identified]   Room:   [address] [place] [address] Day:  2  Admit Date:  10/6/2022  8:01 PM    PCP:   DIONE Wilcox CNP  Code Status:  Full Code    Subjective:     C/C:   Chief Complaint   Patient presents with    Shortness of Breath    Wheezing     Patient states she stated with SOB @ 0200 then today felt her heart racing. EMS gave aerosol PTA. Patient seen in follow-up for acute exacerbation of COPD, patient states \"I am still coughing a lot\"    Interval History Status: improved. Patient is doing better overall in the aspect that she has been weaned off oxygen. She still having significant cough. I did review her home medications and note that her Symbicort has not yet been resumed. Subsequently I am going to resume her Symbicort. I am going to provide her Tessalon for cough.   She states that she does feel that she has mucus but cannot mobilize it and I will start her on guaifenesin. I did have a long discussion with her regarding the fact that guaifenesin may not offer any therapeutic benefit but it is a benign medication and could be helpful. She is agreeable to this plan. We will work on transitioning to oral steroids tomorrow with anticipated discharge home tomorrow. Brief History: This very pleasant 50-year-old female presented to hospital with shortness of breath secondary to COPD exacerbation. Review of Systems:     Constitutional:  negative for chills, fevers, sweats  Respiratory: Patient's shortness of breath and wheezing has improved. She still has a minimal wheezing complains of a significant cough. Cardiovascular:  negative for chest pain, chest pressure/discomfort, lower extremity edema, palpitations  Gastrointestinal:  negative for abdominal pain, constipation, diarrhea, nausea, vomiting  Neurological:  negative for dizziness, headache    Medications: Allergies:     Allergies   Allergen Reactions    Oxycodone-Acetaminophen      Other reaction(s): Unknown    Penicillin G Sodium Rash    Penicillins Rash       Current Meds:   Scheduled Meds:    benzonatate  200 mg Oral TID    guaiFENesin  600 mg Oral BID    budesonide-formoterol  2 puff Inhalation BID    methylPREDNISolone  40 mg IntraVENous Q12H    azithromycin  500 mg Oral Daily    metoprolol succinate  12.5 mg Oral Daily    montelukast  10 mg Oral Nightly    sodium chloride flush  5-40 mL IntraVENous 2 times per day    enoxaparin  30 mg SubCUTAneous Daily    ipratropium-albuterol  1 ampule Inhalation Q4H WA     Continuous Infusions:    sodium chloride       PRN Meds: lactulose, sodium chloride flush, sodium chloride, ondansetron **OR** ondansetron, polyethylene glycol, acetaminophen **OR** acetaminophen, albuterol    Data:     Past Medical History:   has a past medical history of Asthma, COPD (chronic obstructive pulmonary disease) ([facility] [address] Dyspnea, Fibromyalgia, Herniated disc, cervical, Herniated lumbar intervertebral disc, and Kidney disease. Social History:   reports that she quit smoking about 8 years ago. She has a 30.00 pack-year smoking history. She has never used smokeless tobacco. She reports current alcohol use of about 3.0 standard drinks per week. She reports that she does not use drugs. Family History:   Family History   Problem Relation Age of Onset    Diabetes Mother     Heart Disease Father     Breast Cancer Maternal Aunt        Vitals:  /76   Pulse 92   Temp 97 °F (36.1 °C) (Temporal)   Resp 17   Ht 5' 4\" (1.626 m)   Wt 121 lb 4.1 oz (55 kg)   LMP  (LMP Unknown)   SpO2 90%   BMI 20.81 kg/m²   Temp (24hrs), Av.7 °F (36.5 °C), Min:97 °F (36.1 °C), Max:98.2 °F (36.8 °C)    No results for input(s): POCGLU in the last 72 hours. I/O (24Hr): Intake/Output Summary (Last 24 hours) at 10/8/2022 1016  Last data filed at 10/7/2022 2014  Gross per 24 hour   Intake 10 ml   Output --   Net 10 ml       Labs:  Hematology:  Recent Labs     10/07/22  1239   DDIMER 0.61     Chemistry:  Recent Labs     10/06/22  2015 10/07/22  0416   NA  --  136   K  --  5.0   CL  --  101   CO2  --  23   GLUCOSE  --  170*   BUN  --  20   CREATININE  --  0.99*   ANIONGAP  --  12   LABGLOM  --  >60   CALCIUM  --  9.2   TROPHS 34*  --    No results for input(s): PROT, LABALBU, LABA1C, U4BEFJI, I0MXTHX, FT4, TSH, AST, ALT, LDH, GGT, ALKPHOS, LABGGT, BILITOT, BILIDIR, AMMONIA, AMYLASE, LIPASE, LACTATE, CHOL, HDL, LDLCHOLESTEROL, CHOLHDLRATIO, TRIG, VLDL, LJX59KW, PHENYTOIN, PHENYF, URICACID, POCGLU in the last 72 hours. ABG:No results found for: POCPH, PHART, PH, POCPCO2, HDT8HRW, PCO2, POCPO2, PO2ART, PO2, POCHCO3, FWP8NQT, HCO3, NBEA, PBEA, BEART, BE, THGBART, THB, CVB6CDR, CVPZ3JKQ, T6FEIXFE, O2SAT, FIO2  No results found for: SPECIAL  No results found for: CULTURE    Radiology:  XR CHEST PORTABLE    Result Date: 10/7/2022  Unchanged appearance of the chest without acute airspace disease identified. XR CHEST PORTABLE    Result Date: 10/6/2022  1. No acute cardiopulmonary abnormality. 2. Hyperinflated lungs, consistent with COPD.        Physical Examination:       General appearance:  alert, cooperative and no distress  Mental Status:  oriented to person, place and time and normal affect  Lungs: Mild end expiratory wheezing noted on exam  Heart:  regular rate and rhythm, no murmur  Abdomen:  soft, nontender, nondistended, normal bowel sounds, no masses, hepatomegaly, splenomegaly  Extremities:  no edema, redness, tenderness in the calves  Skin:  no gross lesions, rashes, induration    Assessment:     Hospital Problems             Last Modified POA    * (Principal) COPD exacerbation ([facility] [address] 10/6/2022 Yes    Essential hypertension 10/7/2022 Yes       Plan:     COPD exacerbation  Continue IV steroids for another 24 hours  Resume Symbicort  Cough  Add Tessalon as needed and scheduled guaifenesin  Essential hypertension  Stable, no changes    Anticipate discharge home in the next 24 hours    Claudette Bustillo DO  10/8/2022  10:16 AM

## 2022-10-08 NOTE — PLAN OF CARE
Problem: Discharge Planning  Goal: Discharge to home or other facility with appropriate resources  Outcome: Progressing  Flowsheets (Taken 10/7/2022 2000)  Discharge to home or other facility with appropriate resources:   Identify barriers to discharge with patient and caregiver   Arrange for needed discharge resources and transportation as appropriate   Identify discharge learning needs (meds, wound care, etc)   Refer to discharge planning if patient needs post-hospital services based on physician order or complex needs related to functional status, cognitive ability or social support system     Problem: Pain  Goal: Verbalizes/displays adequate comfort level or baseline comfort level  Outcome: Progressing     Problem: Safety - Adult  Goal: Free from fall injury  Outcome: Progressing     Problem: ABCDS Injury Assessment  Goal: Absence of physical injury  Outcome: Progressing

## 2022-10-08 NOTE — PLAN OF CARE
Problem: Discharge Planning  Goal: Discharge to home or other facility with appropriate resources  10/8/2022 1605 by Avery Solis RN  Outcome: Progressing  Flowsheets (Taken 10/8/2022 0800)  Discharge to home or other facility with appropriate resources: Identify barriers to discharge with patient and caregiver  10/8/2022 0348 by Sydni Pitts RN  Outcome: Progressing  Flowsheets (Taken 10/7/2022 2000)  Discharge to home or other facility with appropriate resources:   Identify barriers to discharge with patient and caregiver   Arrange for needed discharge resources and transportation as appropriate   Identify discharge learning needs (meds, wound care, etc)   Refer to discharge planning if patient needs post-hospital services based on physician order or complex needs related to functional status, cognitive ability or social support system    Patients questions and concerns addressed and answered. Problem: Pain  Goal: Verbalizes/displays adequate comfort level or baseline comfort level  10/8/2022 1605 by Avery Solis RN  Outcome: Progressing  10/8/2022 0348 by Sydni iPtts RN  Outcome: Progressing  Pain scale preformed per protocol and pt treated for pain as documented. Education given. Problem: Safety - Adult  Goal: Free from fall injury  10/8/2022 1605 by Avery Solis RN  Outcome: Progressing  10/8/2022 0348 by Sydni Pitts RN  Outcome: Progressing  Fall assessment preformed. Bed in low locked position with call light and tray table within reach. Education given. Will continue to monitor. Problem: ABCDS Injury Assessment  Goal: Absence of physical injury  10/8/2022 1605 by Avery Solis RN  Outcome: Progressing  10/8/2022 0348 by Sydni Pitts RN  Outcome: Progressing  Fall assessment preformed. Bed in low locked position with call light and tray table within reach. Education given. Will continue to monitor.

## 2022-10-09 VITALS
BODY MASS INDEX: 20.95 KG/M2 | TEMPERATURE: 97.5 F | SYSTOLIC BLOOD PRESSURE: 157 MMHG | OXYGEN SATURATION: 98 % | RESPIRATION RATE: 18 BRPM | DIASTOLIC BLOOD PRESSURE: 86 MMHG | WEIGHT: 122.7 LBS | HEIGHT: 64 IN | HEART RATE: 82 BPM

## 2022-10-09 PROCEDURE — 6360000002 HC RX W HCPCS: Performed by: STUDENT IN AN ORGANIZED HEALTH CARE EDUCATION/TRAINING PROGRAM

## 2022-10-09 PROCEDURE — 6370000000 HC RX 637 (ALT 250 FOR IP): Performed by: HOSPITALIST

## 2022-10-09 PROCEDURE — 6370000000 HC RX 637 (ALT 250 FOR IP): Performed by: NURSE PRACTITIONER

## 2022-10-09 PROCEDURE — 99232 SBSQ HOSP IP/OBS MODERATE 35: CPT | Performed by: HOSPITALIST

## 2022-10-09 PROCEDURE — 2580000003 HC RX 258: Performed by: NURSE PRACTITIONER

## 2022-10-09 PROCEDURE — 94640 AIRWAY INHALATION TREATMENT: CPT

## 2022-10-09 PROCEDURE — 6370000000 HC RX 637 (ALT 250 FOR IP): Performed by: STUDENT IN AN ORGANIZED HEALTH CARE EDUCATION/TRAINING PROGRAM

## 2022-10-09 PROCEDURE — 94761 N-INVAS EAR/PLS OXIMETRY MLT: CPT

## 2022-10-09 PROCEDURE — 6360000002 HC RX W HCPCS: Performed by: NURSE PRACTITIONER

## 2022-10-09 RX ORDER — PREDNISONE 20 MG/1
40 TABLET ORAL DAILY
Status: DISCONTINUED | OUTPATIENT
Start: 2022-10-09 | End: 2022-10-09 | Stop reason: HOSPADM

## 2022-10-09 RX ORDER — BUTALBITAL, ACETAMINOPHEN AND CAFFEINE 50; 325; 40 MG/1; MG/1; MG/1
1 TABLET ORAL EVERY 4 HOURS PRN
Qty: 120 TABLET | Refills: 0 | Status: SHIPPED | OUTPATIENT
Start: 2022-10-09

## 2022-10-09 RX ORDER — IPRATROPIUM BROMIDE AND ALBUTEROL SULFATE 2.5; .5 MG/3ML; MG/3ML
3 SOLUTION RESPIRATORY (INHALATION) 4 TIMES DAILY
Qty: 360 ML | Refills: 3 | Status: SHIPPED | OUTPATIENT
Start: 2022-10-09

## 2022-10-09 RX ORDER — AZITHROMYCIN 500 MG/1
500 TABLET, FILM COATED ORAL DAILY
Qty: 2 TABLET | Refills: 0 | Status: SHIPPED | OUTPATIENT
Start: 2022-10-09 | End: 2022-10-11

## 2022-10-09 RX ORDER — TRAMADOL HYDROCHLORIDE 50 MG/1
50 TABLET ORAL EVERY 4 HOURS PRN
Status: DISCONTINUED | OUTPATIENT
Start: 2022-10-09 | End: 2022-10-09 | Stop reason: HOSPADM

## 2022-10-09 RX ORDER — BENZONATATE 200 MG/1
200 CAPSULE ORAL 3 TIMES DAILY
Qty: 30 CAPSULE | Refills: 0 | Status: SHIPPED | OUTPATIENT
Start: 2022-10-09 | End: 2022-10-16

## 2022-10-09 RX ORDER — PREDNISONE 10 MG/1
TABLET ORAL
Qty: 30 TABLET | Refills: 0 | Status: SHIPPED | OUTPATIENT
Start: 2022-10-09 | End: 2022-10-21

## 2022-10-09 RX ORDER — ALBUTEROL SULFATE 90 UG/1
2 AEROSOL, METERED RESPIRATORY (INHALATION) EVERY 4 HOURS PRN
Qty: 18 G | Refills: 3 | Status: SHIPPED | OUTPATIENT
Start: 2022-10-09

## 2022-10-09 RX ORDER — GUAIFENESIN DEXTROMETHORPHAN HYDROBROMIDE ORAL SOLUTION 10; 100 MG/5ML; MG/5ML
10 SOLUTION ORAL EVERY 4 HOURS PRN
Qty: 355 ML | Refills: 0 | Status: SHIPPED | OUTPATIENT
Start: 2022-10-09

## 2022-10-09 RX ORDER — ALBUTEROL SULFATE 2.5 MG/3ML
2.5 SOLUTION RESPIRATORY (INHALATION) EVERY 6 HOURS PRN
Qty: 120 EACH | Refills: 3 | Status: SHIPPED | OUTPATIENT
Start: 2022-10-09

## 2022-10-09 RX ADMIN — AZITHROMYCIN MONOHYDRATE 500 MG: 250 TABLET ORAL at 09:04

## 2022-10-09 RX ADMIN — ONDANSETRON 4 MG: 2 INJECTION INTRAMUSCULAR; INTRAVENOUS at 04:40

## 2022-10-09 RX ADMIN — IPRATROPIUM BROMIDE AND ALBUTEROL SULFATE 1 AMPULE: .5; 3 SOLUTION RESPIRATORY (INHALATION) at 08:31

## 2022-10-09 RX ADMIN — GUAIFENESIN 600 MG: 600 TABLET, EXTENDED RELEASE ORAL at 09:02

## 2022-10-09 RX ADMIN — GUAIFENESIN DEXTROMETHORPHAN HYDROBROMIDE ORAL SOLUTION 10 ML: 200; 20 SOLUTION ORAL at 04:39

## 2022-10-09 RX ADMIN — METOPROLOL SUCCINATE 12.5 MG: 25 TABLET, EXTENDED RELEASE ORAL at 09:02

## 2022-10-09 RX ADMIN — ENOXAPARIN SODIUM 30 MG: 100 INJECTION SUBCUTANEOUS at 09:02

## 2022-10-09 RX ADMIN — METHYLPREDNISOLONE SODIUM SUCCINATE 40 MG: 40 INJECTION, POWDER, FOR SOLUTION INTRAMUSCULAR; INTRAVENOUS at 04:40

## 2022-10-09 RX ADMIN — SODIUM CHLORIDE, PRESERVATIVE FREE 10 ML: 5 INJECTION INTRAVENOUS at 09:09

## 2022-10-09 RX ADMIN — PREDNISONE 40 MG: 20 TABLET ORAL at 09:02

## 2022-10-09 RX ADMIN — BUDESONIDE AND FORMOTEROL FUMARATE DIHYDRATE 2 PUFF: 160; 4.5 AEROSOL RESPIRATORY (INHALATION) at 08:31

## 2022-10-09 RX ADMIN — BENZONATATE 200 MG: 100 CAPSULE ORAL at 09:02

## 2022-10-09 RX ADMIN — TRAMADOL HYDROCHLORIDE 50 MG: 50 TABLET, COATED ORAL at 04:40

## 2022-10-09 ASSESSMENT — PAIN DESCRIPTION - LOCATION: LOCATION: SHOULDER;NECK

## 2022-10-09 ASSESSMENT — PAIN SCALES - GENERAL: PAINLEVEL_OUTOF10: 5

## 2022-10-09 NOTE — PLAN OF CARE
Problem: Discharge Planning  Goal: Discharge to home or other facility with appropriate resources  10/9/2022 0000 by Kenzie Gr RN  Outcome: Progressing   Patient actively participates in ADLs and decision making regarding plan of care     Problem: Pain  Goal: Verbalizes/displays adequate comfort level or baseline comfort level  10/9/2022 0000 by Kenzie Gr RN  Outcome: Progressing   No new signs/symptoms of pain noted, pain rating < 3 on scale 0-10, pain controlled with medication/ repositioning     Problem: Safety - Adult  Goal: Free from fall injury  10/9/2022 0000 by Kenzie Gr RN  Outcome: Progressing   No falls/injuries this shift, bed in lowest position, breaks on, bed alarm on, call light within reach, side rails up X2     Problem: ABCDS Injury Assessment  Goal: Absence of physical injury  10/9/2022 0000 by Kenzie Gr RN  Outcome: Progressing   Fall assessment preformed. Bed in low locked position with call light and tray table within reach. Education given.

## 2022-10-09 NOTE — PROGRESS NOTES
[facility]  Office: [address], DO, Terry Pickering, DO, Lorenzo Brown, DO, Neftaly Escobedo Blood, DO, Esperanza Abdullahi MD, Madelin Bailey MD, Josse Jacome MD, Sudarshan Choudhury MD,  Mary Bai MD, John Naylor MD, Dave Schmidt DO, Amy Haider MD,  Gregoria Lopez MD, Licha Russo MD, Morena Rodriguez, DO, Tita Lechuga MD, Pedro Crowder MD, Asia Rdz MD, Shandra Price MD, Angelo Ogden MD, Sara Downey MD, Mariela Box, DO, Jen Sewell MD, Gildardo Uribe MD, Khushbu Esteves, CNP,  Virgilio Sibley, CNP, Dasia Persaud, CNP, Lucrecia Gordon, CNP,  Chris Alexander, DNP, Carol Martini, CNP, Oskar Rudd, CNP, Maria D Jc, CNP, Deni Somers, CNP, Cristobal Mix, CNP, Ana Newsome PA-C, Jeff Ramires, CNS, Cale Adkins, DNP, Linda Grover, CNP, Govind Alves, CNP, Hunter Quigley 1672    Progress Note    10/9/2022    12:03 PM    Name:   Elizabeth Mcneil  MRN:     [id number]     Acct:      [de-identified]   Room:   [address] [place] [address] Day:  3  Admit Date:  10/6/2022  8:01 PM    PCP:   DIONE Ellis CNP  Code Status:  Full Code    Subjective:     C/C:   Chief Complaint   Patient presents with    Shortness of Breath    Wheezing     Patient states she stated with SOB @ 0200 then today felt her heart racing. EMS gave aerosol PTA. Patient seen in follow-up for acute exacerbation of COPD, patient states \"I am better\"    Interval History Status: improved. Patient continues to do well. Her predominant issue is that of cough. I did have a long discussion with her regarding the fact that her cough may be present for a couple of weeks. I did explain that it is not uncommon to have a postinfectious cough. Her cough has improved with Tessalon and Robitussin. I do recommend continuing treatment on outpatient.   She has been transitioned to oral antibiotics and can follow-up with her primary pulmonologist.  She does not have many of her medications available I have provided a majority of her medications to her. She denies any questions or concerns at this point in time. He is hemodynamically stable and has been off oxygen. Brief History: This very pleasant 65-year-old female presented to hospital with shortness of breath secondary to COPD exacerbation. Review of Systems:     Constitutional:  negative for chills, fevers, sweats  Respiratory: Shortness of breath and wheezing has improved dramatically, patient does have a persistent cough that is nonproductive  Cardiovascular:  negative for chest pain, chest pressure/discomfort, lower extremity edema, palpitations  Gastrointestinal:  negative for abdominal pain, constipation, diarrhea, nausea, vomiting  Neurological:  negative for dizziness. Patient does complain of a mild headache    Medications: Allergies:     Allergies   Allergen Reactions    Oxycodone-Acetaminophen      Other reaction(s): Unknown    Penicillin G Sodium Rash    Penicillins Rash       Current Meds:   Scheduled Meds:    predniSONE  40 mg Oral Daily    benzonatate  200 mg Oral TID    guaiFENesin  600 mg Oral BID    budesonide-formoterol  2 puff Inhalation BID    metoprolol succinate  12.5 mg Oral Daily    montelukast  10 mg Oral Nightly    sodium chloride flush  5-40 mL IntraVENous 2 times per day    enoxaparin  30 mg SubCUTAneous Daily    ipratropium-albuterol  1 ampule Inhalation Q4H WA     Continuous Infusions:    sodium chloride       PRN Meds: traMADol, hydrALAZINE, dextromethorphan-guaiFENesin, lactulose, sodium chloride flush, sodium chloride, ondansetron **OR** ondansetron, polyethylene glycol, acetaminophen **OR** acetaminophen, albuterol    Data:     Past Medical History:   has a past medical history of Asthma, COPD (chronic obstructive pulmonary disease) ([facility] [address] Dyspnea, Fibromyalgia, Herniated disc, cervical, Herniated lumbar intervertebral disc, and Kidney disease. Social History:   reports that she quit smoking about 8 years ago. She has a 30.00 pack-year smoking history. She has never used smokeless tobacco. She reports current alcohol use of about 3.0 standard drinks per week. She reports that she does not use drugs. Family History:   Family History   Problem Relation Age of Onset    Diabetes Mother     Heart Disease Father     Breast Cancer Maternal Aunt        Vitals:  BP (!) 157/86   Pulse 82   Temp 97.5 °F (36.4 °C) (Axillary)   Resp 18   Ht 5' 4\" (1.626 m)   Wt 122 lb 11.2 oz (55.7 kg)   LMP  (LMP Unknown)   SpO2 98%   BMI 21.06 kg/m²   Temp (24hrs), Av.9 °F (36.6 °C), Min:97.5 °F (36.4 °C), Max:98.2 °F (36.8 °C)    No results for input(s): POCGLU in the last 72 hours. I/O (24Hr): Intake/Output Summary (Last 24 hours) at 10/9/2022 1203  Last data filed at 10/9/2022 0832  Gross per 24 hour   Intake 420 ml   Output --   Net 420 ml       Labs:  Hematology:  Recent Labs     10/07/22  1239   DDIMER 0.61     Chemistry:  Recent Labs     10/06/22  2015 10/07/22  0416   NA  --  136   K  --  5.0   CL  --  101   CO2  --  23   GLUCOSE  --  170*   BUN  --  20   CREATININE  --  0.99*   ANIONGAP  --  12   LABGLOM  --  >60   CALCIUM  --  9.2   TROPHS 34*  --    No results for input(s): PROT, LABALBU, LABA1C, O7XOWPN, T1TVGDZ, FT4, TSH, AST, ALT, LDH, GGT, ALKPHOS, LABGGT, BILITOT, BILIDIR, AMMONIA, AMYLASE, LIPASE, LACTATE, CHOL, HDL, LDLCHOLESTEROL, CHOLHDLRATIO, TRIG, VLDL, CWY33HM, PHENYTOIN, PHENYF, URICACID, POCGLU in the last 72 hours. ABG:No results found for: POCPH, PHART, PH, POCPCO2, FCP4JQS, PCO2, POCPO2, PO2ART, PO2, POCHCO3, GIK3RYG, HCO3, NBEA, PBEA, BEART, BE, THGBART, THB, BZC5TSR, QEBU0LFJ, L5UTUIVW, O2SAT, FIO2  No results found for: SPECIAL  No results found for: CULTURE    Radiology:  XR CHEST PORTABLE    Result Date: 10/7/2022  Unchanged appearance of the chest without acute airspace disease identified.      XR CHEST PORTABLE    Result Date: 10/6/2022  1. No acute cardiopulmonary abnormality. 2. Hyperinflated lungs, consistent with COPD.        Physical Examination:       General appearance:  alert, cooperative and no distress  Mental Status:  oriented to person, place and time and normal affect  Lungs: Very faint and minimal wheeze predominantly and expiratory on exam  Heart:  regular rate and rhythm, no murmur  Abdomen:  soft, nontender, nondistended, normal bowel sounds, no masses, hepatomegaly, splenomegaly  Extremities:  no edema, redness, tenderness in the calves  Skin:  no gross lesions, rashes, induration    Assessment:     Hospital Problems             Last Modified POA    * (Principal) COPD exacerbation ([facility] [address] 10/6/2022 Yes    Essential hypertension 10/7/2022 Yes       Plan:     Acute exacerbation of COPD +/- bronchitis  Clinically improved, patient has been transitioned to oral antibiotics as well as steroids  Cough  Continue Robitussin/Tessalon on discharge  Essential hypertension  Patient states that she does not take antihypertensive medications and does not wish to continue these on discharge  Patient strongly recommended to follow-up with primary care physician for blood pressure monitoring  Headache  Fioricet as needed    Discharge home    Dante Muhammad DO  10/9/2022  12:03 PM

## 2022-10-09 NOTE — PLAN OF CARE
Problem: Discharge Planning  Goal: Discharge to home or other facility with appropriate resources  10/9/2022 0804 by Thi Shearer RN  Outcome: Adequate for Discharge     Problem: Pain  Goal: Verbalizes/displays adequate comfort level or baseline comfort level  10/9/2022 0804 by Thi Shearer RN  Outcome: Adequate for Discharge     Problem: Safety - Adult  Goal: Free from fall injury  10/9/2022 0804 by Thi Shearer RN  Outcome: Adequate for Discharge     Problem: ABCDS Injury Assessment  Goal: Absence of physical injury  10/9/2022 0804 by Thi Shearer RN  Outcome: Adequate for Discharge

## 2022-10-09 NOTE — DISCHARGE SUMMARY
[facility]  Office: [address], DO, Ulisses Patella, DO, Emerald Aldrich, DO, Donna Jolley Blood, DO, Renu Carpenter MD, Yoly Titus MD, Beto Rae MD, Shikha Wood MD,  Marlena Whipple MD, Jaron Chávez MD, Carey Hurley DO, Christiana Valdez MD,  Daniela Vincent MD, Maria Luisa Trejo MD, Artem Wilder DO, Dimitri Mendez MD, Román Woodruff MD, Cary Azar MD, Gutierrez Jay MD, Nico Reynoso MD, Cara Nicholas MD, Govind Thomas DO, Paul Donaldson MD, Renny Tapia MD, Estelita Solomon, CNP,  Kev Smith, CNP, Karol Mcneil, CNP, Merwyn Cooks, CNP,  María Rao, DNP, Roxie Alvarado, CNP, Marcellus Roberts, CNP, Shailesh Bustamante, CNP, Mando Carr, CNP, Ghassan Foster, CNP, Iván Harman PA-C, Niurka Caballero, CNS, Sean Powers, DNP, Desiree Tomlinson, CNP, Tamar Fernandes, CNP, Hunter Littlejohn 4434    Discharge Summary     Patient ID: Manuel Orozco  :  1950   MRN: [id number]     ACCOUNT:  [de-identified]   Patient's PCP: DIONE David CNP  Admit Date: 10/6/2022   Discharge Date: 10/9/2022     Length of Stay: 3  Code Status:  Full Code  Admitting Physician: No admitting provider for patient encounter. Discharge Physician: Didier Angel DO     Active Discharge Diagnoses:     Hospital Problem Lists:  Principal Problem:    COPD exacerbation [facility])  Active Problems:    Essential hypertension  Resolved Problems:    * No resolved hospital problems. *      Admission Condition:  fair     Discharged Condition: good    Hospital Stay:     Hospital Course:  Manuel Orozco is a 67 y.o. female who was admitted for the management of  COPD exacerbation ([facility] [address] , presented to ER with Shortness of Breath and Wheezing (Patient states she stated with SOB @ 0200 then today felt her heart racing.  EMS gave aerosol PTA. )    This very pleasant 76-year-old female presented to hospital with dyspnea, cough, wheezing. She was found to have an acute exacerbation of COPD with suspected component of bronchitis. The patient was treated with the usual course of antibiotics, steroids, aerosols. The patient improved and was transitioned over to oral medications. She did have a cough for which she was treated with Robitussin and Tessalon. Patient is discharged home in stable condition. Significant therapeutic interventions: As above    Significant Diagnostic Studies:   Labs / Micro:  CBC:   Lab Results   Component Value Date/Time    WBC 5.0 09/05/2021 08:15 AM    RBC 4.27 09/05/2021 08:15 AM    HGB 13.7 09/05/2021 08:15 AM    HCT 46.5 09/05/2021 08:15 AM    .9 09/05/2021 08:15 AM    MCH 32.1 09/05/2021 08:15 AM    MCHC 29.5 09/05/2021 08:15 AM    RDW 13.2 09/05/2021 08:15 AM     09/05/2021 08:15 AM     BMP:    Lab Results   Component Value Date/Time    GLUCOSE 170 10/07/2022 04:16 AM     10/07/2022 04:16 AM    K 5.0 10/07/2022 04:16 AM     10/07/2022 04:16 AM    CO2 23 10/07/2022 04:16 AM    ANIONGAP 12 10/07/2022 04:16 AM    BUN 20 10/07/2022 04:16 AM    CREATININE 0.99 10/07/2022 04:16 AM    BUNCRER NOT REPORTED 09/05/2021 08:15 AM    CALCIUM 9.2 10/07/2022 04:16 AM    LABGLOM >60 10/07/2022 04:16 AM    GFRAA >60 09/05/2021 08:15 AM    GFR      09/05/2021 08:15 AM    GFR NOT REPORTED 09/05/2021 08:15 AM        Radiology:  XR CHEST PORTABLE    Result Date: 10/7/2022  Unchanged appearance of the chest without acute airspace disease identified. XR CHEST PORTABLE    Result Date: 10/6/2022  1. No acute cardiopulmonary abnormality. 2. Hyperinflated lungs, consistent with COPD. Consultations:    Consults:     Final Specialist Recommendations/Findings:   None      The patient was seen and examined on day of discharge and this discharge summary is in conjunction with any daily progress note from day of discharge.     Discharge plan:     Disposition: Home    Physician Follow Up:     Agnelica Peña, APRN - CNP  [address] [facility]  [phone]    In 1 day         Requiring Further Evaluation/Follow Up POST HOSPITALIZATION/Incidental Findings: None    Diet: regular diet    Activity: As tolerated    Instructions to Patient: None    Discharge Medications:      Medication List        START taking these medications      azithromycin 500 MG tablet  Commonly known as: ZITHROMAX  Take 1 tablet by mouth daily for 2 doses     benzonatate 200 MG capsule  Commonly known as: TESSALON  Take 1 capsule by mouth 3 times daily for 7 days     butalbital-acetaminophen-caffeine -40 MG per tablet  Commonly known as: FIORICET, ESGIC  Take 1 tablet by mouth every 4 hours as needed for Headaches     dextromethorphan-guaiFENesin  MG/5ML syrup  Commonly known as: ROBITUSSIN-DM  Take 10 mLs by mouth every 4 hours as needed for Cough     ipratropium-albuterol 0.5-2.5 (3) MG/3ML Soln nebulizer solution  Commonly known as: DUONEB  Inhale 3 mLs into the lungs 4 times daily     predniSONE 10 MG tablet  Commonly known as: DELTASONE  Take 4 tablets by mouth daily for 3 days, THEN 3 tablets daily for 3 days, THEN 2 tablets daily for 3 days, THEN 1 tablet daily for 3 days. Start taking on: October 9, 2022            CONTINUE taking these medications      * albuterol (2.5 MG/3ML) 0.083% nebulizer solution  Commonly known as: PROVENTIL  Take 3 mLs by nebulization every 6 hours as needed for Wheezing     * albuterol sulfate  (90 Base) MCG/ACT inhaler  Commonly known as: Ventolin HFA  Inhale 2 puffs into the lungs every 4 hours as needed for Wheezing     Symbicort 160-4.5 MCG/ACT Aero  Generic drug: budesonide-formoterol           * This list has 2 medication(s) that are the same as other medications prescribed for you. Read the directions carefully, and ask your doctor or other care provider to review them with you.                 STOP taking these medications lactulose 10 GM/15ML solution  Commonly known as: CHRONULAC     metoprolol succinate 25 MG extended release tablet  Commonly known as: TOPROL XL     montelukast 10 MG tablet  Commonly known as: SINGULAIR               Where to Get Your Medications        These medications were sent to [address] [place] [address] [id number]      Phone: [phone]   albuterol (2.5 MG/3ML) 0.083% nebulizer solution  albuterol sulfate  (90 Base) MCG/ACT inhaler  azithromycin 500 MG tablet  benzonatate 200 MG capsule  butalbital-acetaminophen-caffeine -40 MG per tablet  dextromethorphan-guaiFENesin  MG/5ML syrup  ipratropium-albuterol 0.5-2.5 (3) MG/3ML Soln nebulizer solution  predniSONE 10 MG tablet         No discharge procedures on file. Time Spent on discharge is  17 mins in patient examination, evaluation, counseling as well as medication reconciliation, prescriptions for required medications, discharge plan and follow up. Electronically signed by   Claudette Bustillo DO  10/9/2022  12:06 PM      Thank you Dr. Carli Garay, APRN - CNP for the opportunity to be involved in this patient's care.

## 2022-10-09 NOTE — PROGRESS NOTES
RT in to see patient regarding Aerosol therapy . Pt currently awake , alert . Room Air reflects a sat 98% . Breath sounds are diminished anteriorly and posteriorly the patient states she still wade have a bothersome cough , I did encourage her to work on breathing .

## 2022-10-10 ENCOUNTER — CARE COORDINATION (OUTPATIENT)
Dept: CASE MANAGEMENT | Age: 72
End: 2022-10-10

## 2022-10-10 ENCOUNTER — TELEPHONE (OUTPATIENT)
Dept: FAMILY MEDICINE CLINIC | Age: 72
End: 2022-10-10

## 2022-10-10 DIAGNOSIS — J44.1 COPD EXACERBATION (HCC): Primary | ICD-10-CM

## 2022-10-10 PROCEDURE — 1111F DSCHRG MED/CURRENT MED MERGE: CPT | Performed by: NURSE PRACTITIONER

## 2022-10-10 NOTE — CARE COORDINATION
[facility] Care Transitions Initial Follow Up Call    Call within 2 business days of discharge: Yes    LPN Care Coordinator contacted the patient by telephone to perform post hospital discharge assessment. Verified name and  with patient as identifiers. Provided introduction to self, and explanation of the LPN Care Coordinator role. Patient: Antoinette Donaldson Patient : 1950   MRN: [id number]  Reason for Admission: COPD EXACERBATION  Discharge Date: 10/9/22 RARS: Readmission Risk Score: 9.3      Last Discharge  Street       Date Complaint Diagnosis Description Type Department Provider    10/6/22 Shortness of Breath; Wheezing Moderate asthma with exacerbation, unspecified whether persistent . .. ED to Hosp-Admission (Discharged) (ADMITTED) JODI JOSEPH MS Coreyadi Iwona, DO; Landy Ma. .. Was this an external facility discharge? No Discharge Facility: N/A    Challenges to be reviewed by the provider   Additional needs identified to be addressed with provider: Yes  none               Method of communication with provider: none. Transitions of Care Initial Call: Spoke to Andre Chow the role of Care Transition Nurse and the Transition program, patient is agreeable to follow up calls [facility] continues to have dyspnea with any exertion. Productive cough with thick tan phlegm. She does not use oxygen at home. Pt states she is using her nebulizer as directed. Her pulse ox reads 86-87%. Appetite and fluid intake is fair. Bowel and bladder WNL. Denies need for [address] or DME at this time. [address]F medication reconciliation completed. Pt is taking Zithromax, prednisone and nebulizer treatments. Educated on completing Zithromax and Prednisone. Pt is not using Fioricet, Tessalon  or Symbicort due to insurance not covering mediations. Educated on returning to the ED if respiratory issues worsen. Verbalized understanding.  Message routed to [facility] [employer] support specialist to assist with medication costs. Educated patient on benefits of RPM program r/t dx of COPD. Pt declines enrollment at this time. Discussed need for 7 day discharge follow up with PCP. Pt declines at this time. She states she will follow up with her Pulmonologist Dr Phan Mcgarry. on 10/21/22. Will continue to follow. Patient is aware of when to contact MD with any new or worsening symptoms. Advised to contact PCP 24/7 with any health concerns for early outpatient intervention in an effort to avoid hospitalization. Report any worsening symptoms to PCP and/or Call 911 and/or GO TO EMERGENCY ROOM if symptoms are severe. Expresses understanding. [facility] Care Coordinator reviewed discharge instructions with patient who verbalized understanding. The patient was given an opportunity to ask questions and does not have any further questions or concerns at this time. Were discharge instructions available to patient? Yes. Reviewed appropriate site of care based on symptoms and resources available to patient including: PCP  Specialist  When to call [address] The patient agrees to contact the PCP office for questions related to their healthcare. Advance Care Planning:   Does patient have an Advance Directive: reviewed and current. Medication reconciliation was performed with patient, who verbalizes understanding of administration of home medications. Medications reviewed, 1111F entered: yes    Was patient discharged with a pulse oximeter? no    Non-face-to-face services provided:  Obtained and reviewed discharge summary and/or continuity of care documents    Offered patient enrollment in the Remote Patient Monitoring (RPM) program for in-home monitoring: Yes, but did not enroll at this time. Care Transitions 24 Hour Call    Do you have all of your prescriptions and are they filled?: Yes  Care Transitions Interventions         Follow Up  No future appointments.     [facility] Care Coordinator provided contact information. Plan for follow-up call in 1-2 days based on severity of symptoms and risk factors.   Plan for next call: symptom management-cough dyspnea     EARNESTINE Gallaghermidani No LIZA Care Transitions Nurse   [phone]

## 2022-10-10 NOTE — TELEPHONE ENCOUNTER
[address] Transitions Initial Follow Up Call    Outreach made within 2 business days of discharge: Yes    Patient: Frantz Johns Patient : 1950   MRN: [id number]  Reason for Admission: There are no discharge diagnoses documented for the most recent discharge. Discharge Date: 10/9/22       Spoke with: Gali Hayes    Discharge department/facility: [facility] Interactive Patient Contact:  Was patient able to fill all prescriptions: Yes  Was patient instructed to bring all medications to the follow-up visit: Yes  Is patient taking all medications as directed in the discharge summary?  Yes  Does patient understand their discharge instructions: Yes  Does patient have questions or concerns that need addressed prior to 7-14 day follow up office visit: no    Scheduled appointment with PCP within 7-14 days    Follow Up    Patient is going to follow up with Pulmonary  Caesar Urias MA

## 2022-10-11 ENCOUNTER — CARE COORDINATION (OUTPATIENT)
Dept: CARE COORDINATION | Age: 72
End: 2022-10-11

## 2022-10-11 NOTE — CARE COORDINATION
I spoke with Brigette Garcias regarding her high cost medications, I explained how the tessalon and fiorcet do not have a PAP program. She stated the Symbicort isn't to bad cost and is going to speak with her pulmonologist soon to get more nebules for her nebulizer.            [address] [place] [address]   Medication Assistance  [address] [place] [address], and [employer]    Z) [phone] (a) [phone]

## 2022-10-12 ENCOUNTER — CARE COORDINATION (OUTPATIENT)
Dept: CASE MANAGEMENT | Age: 72
End: 2022-10-12

## 2022-10-12 NOTE — CARE COORDINATION
[facility] Care Transitions Follow Up Call    [facility] Care Coordinator contacted the patient by telephone to follow up after admission on 10/6/22. Verified name and  with patient as identifiers. Patient: Jalen Thakkar  Patient : 1950   MRN: [id number]  Reason for Admission: COPD exacerbation  Discharge Date: 10/9/22 RARS: Readmission Risk Score: 9.3      Needs to be reviewed by the provider   Additional needs identified to be addressed with provider: No  none             Method of communication with provider: none. Subsequent transitional call. Spoke to :  Darci Wise. Pt states she continues to have dyspnea. No use of oxygen. Pulse ox is 90%. Completed antibiotics. She continues to use prednisone and nebulizer treatments. Discussed follow up with PCP. Pt declines and states she is going to follow up with her Pulmonologist Dr Phan Mcgarry on 10/21/22. Encouraged pt to return to the ED if she develops chest pain or respiratory distress. Verbalized understanding. Patient is aware of when to contact MD with any new or worsening symptoms. Advised to contact PCP  with any health concerns for early outpatient intervention in an effort to avoid hospitalization. Report any worsening symptoms to PCP and/or Call 911 and/or GO TO EMERGENCY ROOM if symptoms are severe. Expresses understanding. Addressed changes since last contact:  none  Discussed follow-up appointments. If no appointment was previously scheduled, appointment scheduling offered: Yes. Is follow up appointment scheduled within 7 days of discharge? No.    Follow Up  No future appointments. Non-[facility] follow up appointment(s): DR Dawson Sandoval [address] [place] [address] Coordinator reviewed discharge instructions with patient and discussed any barriers to care and/or understanding of plan of care after discharge. Discussed appropriate site of care based on symptoms and resources available to patient including: PCP  Specialist  When to call [address]  The patient agrees to contact the PCP office for questions related to their healthcare. Advance Care Planning:   reviewed and current. Patients top risk factors for readmission: medical condition-COPD  Interventions to address risk factors    Offered patient enrollment in the Remote Patient Monitoring (RPM) program for in-home monitoring: Patient declined. Care Transitions Subsequent and Final Call    Subsequent and Final Calls  Care Transitions Interventions  Other Interventions:             LPN Care Coordinator provided contact information for future needs. Plan for follow-up call in 7-10 days based on severity of symptoms and risk factors.   Plan for next call: symptom management-dyspnea  cough  wheezing  self management-monitor pulse ox  medication management-take all medications as directed    EARNESTINE Blevins LPN Care Transitions Nurse   [phone]

## 2022-10-19 ENCOUNTER — CARE COORDINATION (OUTPATIENT)
Dept: CASE MANAGEMENT | Age: 72
End: 2022-10-19

## 2022-10-19 NOTE — CARE COORDINATION
[facility] Care Transitions Follow Up Call    LPN Care Coordinator contacted the patient by telephone to follow up after admission on . Verified name and  with patient as identifiers. Patient: Chely Hernandez  Patient : 1950   MRN: [id number]  Reason for Admission: COPD exacerbation   Discharge Date: 10/9/22 RARS: Readmission Risk Score: 9.3      Needs to be reviewed by the provider   Additional needs identified to be addressed with provider: Yes  none             Method of communication with provider: none. Subsequent transitional call. Spoke to :  Sangeeta. Patient states the weather has caused her sinus issues with congestion. She continues to have dyspnea with exertion. No use of oxygen at home. Pulse ox ranges from 93-96%. She completed her antibiotics. Taking her last prednisone today. Using nebulizer as directed. Sangeeta will follow up with Dr Barry Escamilla her pulmonologist on 10/21/22. She declines to follow up with her PCP at this time. Will continue to follow. Patient is aware of when to contact MD with any new or worsening symptoms. Advised to contact PCP  with any health concerns for early outpatient intervention in an effort to avoid hospitalization. Report any worsening symptoms to PCP and/or Call 911 and/or GO TO EMERGENCY ROOM if symptoms are severe. Expresses understanding. Addressed changes since last contact:  none  Discussed follow-up appointments. If no appointment was previously scheduled, appointment scheduling offered: Yes. Is follow up appointment scheduled within 7 days of discharge? No.    Follow Up  No future appointments. Non-[facility] follow up appointment(s): DR HARRIS  10/21/22    N Care Coordinator reviewed discharge instructions with patient and discussed any barriers to care and/or understanding of plan of care after discharge.  Discussed appropriate site of care based on symptoms and resources available to patient including: PCP  Specialist  When to call [phone] Messaging. The patient agrees to contact the PCP office for questions related to their healthcare. Advance Care Planning:   reviewed and current. Patients top risk factors for readmission: medical condition-COPD Exacerbation  Interventions to address risk factors: Obtained and reviewed discharge summary and/or continuity of care documents    Offered patient enrollment in the Remote Patient Monitoring (RPM) program for in-home monitoring: Patient declined. Care Transitions Subsequent and Final Call    Subsequent and Final Calls  Do you have any ongoing symptoms?: Yes  Onset of Patient-reported symptoms: In the past 7 days  Patient-reported symptoms: Shortness of Breath  Have your medications changed?: No  Do you have any questions related to your medications?: No  Do you currently have any active services?: No  Do you have any needs or concerns that I can assist you with?: No  Identified Barriers: None  Care Transitions Interventions  No Identified Needs  Other Interventions:             LPN Care Coordinator provided contact information for future needs. Plan for follow-up call in 5-7 days based on severity of symptoms and risk factors. Plan for next call: symptom management-shortness of breath.    self management-uses nebulizer and check pulse ox  follow-up appointment-with pulmonology    EARNESTINE Coleman LPN Care Transitions Nurse   [phone]

## 2022-10-20 NOTE — PROGRESS NOTES
Physician Progress Note      PATIENTDacamila Armijo  CSN #:                  [id number]  :                       1950  ADMIT DATE:       10/6/2022 8:01 PM  [address] DATE:        10/9/2022 11:58 AM  RESPONDING  PROVIDER #: Ioana Venegas DO          QUERY TEXT:    Patient admitted with COPD exacerbation noted to have a history asthma. If   possible, please document in progress notes and discharge summary further   specificity regarding asthma as follows: The medical record reflects the following:  Risk Factors: 67 yr old w/ hx asthma, COPD, 30 pack year smoking  Clinical Indicators: SOB and wheezing,  h/o asthma on Symbicort, Singular and   Albuterol at home  Treatment: ?IV steroids, oral Azithromycin and bronchodilators    Thank you please contact me for any questions!  Lizeth Chao RN, CCDS, CDI   Supervisor [phone]  Options provided:  -- Asthma exacerbation with COPD exacerbation  -- COPD exacerbation without asthma exacerbation  -- Other - I will add my own diagnosis  -- Disagree - Not applicable / Not valid  -- Disagree - Clinically unable to determine / Unknown  -- Refer to Clinical Documentation Reviewer    PROVIDER RESPONSE TEXT:    This patient has COPD exacerbation without asthma exacerbation    Query created by: Faiza Ramires on 10/18/2022 6:56 AM      Electronically signed by:  Ioana Venegas DO 10/20/2022 3:22 PM

## 2022-10-21 ENCOUNTER — CARE COORDINATION (OUTPATIENT)
Dept: CASE MANAGEMENT | Age: 72
End: 2022-10-21

## 2022-10-21 NOTE — CARE COORDINATION
[facility] Care Transitions Follow Up Call    [facility] Care Coordinator contacted the patient by telephone to follow up after admission on 10/6/22. Verified name and  with patient as identifiers. Patient: Elizabeth Mcneil  Patient : 1950   MRN: [id number]  Reason for Admission: COPD exacerbation   Discharge Date: 10/9/22 RARS: Readmission Risk Score: 9.3      Needs to be reviewed by the provider   Additional needs identified to be addressed with provider: No  none             Method of communication with provider: none. Subsequent transitional call. Spoke to :  Sangeeta. Sangeeta states she is doing well. She was seen by Pulmonologist Dr Caitie Shetty today. Pulse ox was 96%. She has no chest pain or dyspnea at this time. She is using her nebulizer and inhalers . Completed prednisone. No new issues or concerns. Final call. Patient is aware of when to contact MD with any new or worsening symptoms. Advised to contact PCP  with any health concerns for early outpatient intervention in an effort to avoid hospitalization. Report any worsening symptoms to PCP and/or Call 911 and/or GO TO EMERGENCY ROOM if symptoms are severe. Expresses understanding. Addressed changes since last contact:  none  Discussed follow-up appointments. If no appointment was previously scheduled, appointment scheduling offered: Yes. Is follow up appointment scheduled within 7 days of discharge? No.    Follow Up  No future appointments. [facility] follow up appointment(s): DR [address] [facility] [address] Coordinator reviewed discharge instructions with patient and discussed any barriers to care and/or understanding of plan of care after discharge. Discussed appropriate site of care based on symptoms and resources available to patient including: PCP  Specialist  When to call [address] The patient agrees to contact the PCP office for questions related to their healthcare. Advance Care Planning:   reviewed and current.      Patients top risk factors for readmission: medical condition-COPD  Interventions to address risk factors: Obtained and reviewed discharge summary and/or continuity of care documents    Offered patient enrollment in the Remote Patient Monitoring (RPM) program for in-home monitoring: Patient declined. Care Transitions Subsequent and Final Call    Subsequent and Final Calls  Care Transitions Interventions  Other Interventions:             LPN Care Coordinator provided contact information for future needs. No further follow-up call indicated based on severity of symptoms and risk factors.   Plan for next call:  [address] [facility],[address] [facility]itions Nurse   [phone]

## 2023-10-10 ENCOUNTER — HOSPITAL ENCOUNTER (OUTPATIENT)
Dept: GENERAL RADIOLOGY | Age: 73
Discharge: HOME | End: 2023-10-12
Payer: MEDICARE

## 2023-10-10 ENCOUNTER — HOSPITAL ENCOUNTER (OUTPATIENT)
Age: 73
Discharge: HOME | End: 2023-10-12
Payer: MEDICARE

## 2023-10-10 DIAGNOSIS — J44.9 CHRONIC OBSTRUCTIVE PULMONARY DISEASE, UNSPECIFIED COPD TYPE (HCC): ICD-10-CM

## 2023-10-10 PROCEDURE — 71046 X-RAY EXAM CHEST 2 VIEWS: CPT

## 2025-02-01 ENCOUNTER — APPOINTMENT (OUTPATIENT)
Dept: CT IMAGING | Age: 75
End: 2025-02-01
Payer: MEDICARE

## 2025-02-01 ENCOUNTER — HOSPITAL ENCOUNTER (EMERGENCY)
Age: 75
Discharge: HOME | End: 2025-02-02
Attending: STUDENT IN AN ORGANIZED HEALTH CARE EDUCATION/TRAINING PROGRAM
Payer: MEDICARE

## 2025-02-01 VITALS
RESPIRATION RATE: 16 BRPM | OXYGEN SATURATION: 93 % | BODY MASS INDEX: 15.49 KG/M2 | SYSTOLIC BLOOD PRESSURE: 125 MMHG | WEIGHT: 93 LBS | HEIGHT: 65 IN | DIASTOLIC BLOOD PRESSURE: 89 MMHG | TEMPERATURE: 98.4 F | HEART RATE: 105 BPM

## 2025-02-01 DIAGNOSIS — K59.00 CONSTIPATION, UNSPECIFIED CONSTIPATION TYPE: Primary | ICD-10-CM

## 2025-02-01 DIAGNOSIS — K62.89 PROCTITIS: ICD-10-CM

## 2025-02-01 LAB
ALBUMIN SERPL-MCNC: 4.3 G/DL (ref 3.5–5.2)
ALBUMIN/GLOB SERPL: 1.6 {RATIO} (ref 1–2.5)
ALP SERPL-CCNC: 61 U/L (ref 35–104)
ALT SERPL-CCNC: 13 U/L (ref 5–33)
ANION GAP SERPL CALCULATED.3IONS-SCNC: 12 MMOL/L (ref 9–17)
AST SERPL-CCNC: 20 U/L
BACTERIA URNS QL MICRO: ABNORMAL
BASOPHILS # BLD: 0.1 K/UL (ref 0–0.2)
BASOPHILS NFR BLD: 1 % (ref 0–2)
BILIRUB SERPL-MCNC: 0.4 MG/DL (ref 0.3–1.2)
BILIRUB UR QL STRIP: NEGATIVE
BUN SERPL-MCNC: 28 MG/DL (ref 8–23)
CALCIUM SERPL-MCNC: 9.6 MG/DL (ref 8.6–10.4)
CHARACTER UR: ABNORMAL
CHLORIDE SERPL-SCNC: 105 MMOL/L (ref 98–107)
CLARITY UR: CLEAR
CO2 SERPL-SCNC: 20 MMOL/L (ref 20–31)
COLOR UR: YELLOW
CREAT SERPL-MCNC: 1 MG/DL (ref 0.5–0.9)
EOSINOPHIL # BLD: 0.1 K/UL (ref 0–0.4)
EOSINOPHILS RELATIVE PERCENT: 1 % (ref 1–4)
EPI CELLS #/AREA URNS HPF: ABNORMAL /HPF (ref 0–5)
ERYTHROCYTE [DISTWIDTH] IN BLOOD BY AUTOMATED COUNT: 14.4 % (ref 12.5–15.4)
GFR, ESTIMATED: 59 ML/MIN/1.73M2
GLUCOSE SERPL-MCNC: 116 MG/DL (ref 70–99)
GLUCOSE UR STRIP-MCNC: NEGATIVE MG/DL
HCT VFR BLD AUTO: 36.5 % (ref 36–46)
HGB BLD-MCNC: 12.6 G/DL (ref 12–16)
HGB UR QL STRIP.AUTO: NEGATIVE
KETONES UR STRIP-MCNC: NEGATIVE MG/DL
LEUKOCYTE ESTERASE UR QL STRIP: ABNORMAL
LIPASE SERPL-CCNC: 28 U/L (ref 13–60)
LYMPHOCYTES NFR BLD: 1.1 K/UL (ref 1–4.8)
LYMPHOCYTES RELATIVE PERCENT: 11 % (ref 24–44)
MCH RBC QN AUTO: 32.3 PG (ref 26–34)
MCHC RBC AUTO-ENTMCNC: 34.4 G/DL (ref 31–37)
MCV RBC AUTO: 93.7 FL (ref 80–100)
MONOCYTES NFR BLD: 0.6 K/UL (ref 0.1–1.2)
MONOCYTES NFR BLD: 6 % (ref 2–11)
NEUTROPHILS NFR BLD: 81 % (ref 36–66)
NEUTS SEG NFR BLD: 8.3 K/UL (ref 1.8–7.7)
NITRITE UR QL STRIP: NEGATIVE
PH UR STRIP: 6 [PH] (ref 5–8)
PLATELET # BLD AUTO: 360 K/UL (ref 140–450)
PMV BLD AUTO: 8 FL (ref 6–12)
POTASSIUM SERPL-SCNC: 4.3 MMOL/L (ref 3.7–5.3)
PROT SERPL-MCNC: 7 G/DL (ref 6.4–8.3)
PROT UR STRIP-MCNC: NEGATIVE MG/DL
RBC # BLD AUTO: 3.9 M/UL (ref 4–5.2)
RBC #/AREA URNS HPF: ABNORMAL /HPF (ref 0–2)
SODIUM SERPL-SCNC: 137 MMOL/L (ref 135–144)
SP GR UR STRIP: 1.01 (ref 1–1.03)
UROBILINOGEN UR STRIP-ACNC: NORMAL EU/DL (ref 0–1)
WBC #/AREA URNS HPF: ABNORMAL /HPF (ref 0–5)
WBC OTHER # BLD: 10.3 K/UL (ref 3.5–11)

## 2025-02-01 PROCEDURE — 74177 CT ABD & PELVIS W/CONTRAST: CPT

## 2025-02-01 PROCEDURE — 80053 COMPREHEN METABOLIC PANEL: CPT

## 2025-02-01 PROCEDURE — 83690 ASSAY OF LIPASE: CPT

## 2025-02-01 PROCEDURE — 94640 AIRWAY INHALATION TREATMENT: CPT

## 2025-02-01 PROCEDURE — 6360000004 HC RX CONTRAST MEDICATION: Performed by: PHYSICIAN ASSISTANT

## 2025-02-01 PROCEDURE — 36415 COLL VENOUS BLD VENIPUNCTURE: CPT

## 2025-02-01 PROCEDURE — 6370000000 HC RX 637 (ALT 250 FOR IP): Performed by: PHYSICIAN ASSISTANT

## 2025-02-01 PROCEDURE — 99285 EMERGENCY DEPT VISIT HI MDM: CPT | Performed by: STUDENT IN AN ORGANIZED HEALTH CARE EDUCATION/TRAINING PROGRAM

## 2025-02-01 PROCEDURE — 2500000003 HC RX 250 WO HCPCS: Performed by: PHYSICIAN ASSISTANT

## 2025-02-01 PROCEDURE — 85025 COMPLETE CBC W/AUTO DIFF WBC: CPT

## 2025-02-01 PROCEDURE — 81001 URINALYSIS AUTO W/SCOPE: CPT

## 2025-02-01 RX ORDER — IPRATROPIUM BROMIDE AND ALBUTEROL SULFATE 2.5; .5 MG/3ML; MG/3ML
1 SOLUTION RESPIRATORY (INHALATION) ONCE
Status: COMPLETED | OUTPATIENT
Start: 2025-02-01 | End: 2025-02-01

## 2025-02-01 RX ORDER — SODIUM CHLORIDE 0.9 % (FLUSH) 0.9 %
10 SYRINGE (ML) INJECTION ONCE
Status: COMPLETED | OUTPATIENT
Start: 2025-02-01 | End: 2025-02-01

## 2025-02-01 RX ORDER — IOPAMIDOL 755 MG/ML
75 INJECTION, SOLUTION INTRAVASCULAR
Status: COMPLETED | OUTPATIENT
Start: 2025-02-01 | End: 2025-02-01

## 2025-02-01 RX ORDER — 0.9 % SODIUM CHLORIDE 0.9 %
80 INTRAVENOUS SOLUTION INTRAVENOUS ONCE
Status: DISCONTINUED | OUTPATIENT
Start: 2025-02-01 | End: 2025-02-02 | Stop reason: HOSPADM

## 2025-02-01 RX ADMIN — Medication 80 ML: at 22:07

## 2025-02-01 RX ADMIN — IOPAMIDOL 75 ML: 755 INJECTION, SOLUTION INTRAVENOUS at 22:06

## 2025-02-01 RX ADMIN — IPRATROPIUM BROMIDE AND ALBUTEROL SULFATE 1 DOSE: 2.5; .5 SOLUTION RESPIRATORY (INHALATION) at 21:46

## 2025-02-01 RX ADMIN — SODIUM CHLORIDE, PRESERVATIVE FREE 10 ML: 5 INJECTION INTRAVENOUS at 22:06

## 2025-02-01 ASSESSMENT — PAIN DESCRIPTION - LOCATION: LOCATION: ABDOMEN

## 2025-02-01 ASSESSMENT — PAIN - FUNCTIONAL ASSESSMENT: PAIN_FUNCTIONAL_ASSESSMENT: 0-10

## 2025-02-01 ASSESSMENT — PAIN SCALES - GENERAL: PAINLEVEL_OUTOF10: 1

## 2025-02-02 RX ORDER — DOCUSATE SODIUM 100 MG/1
100 CAPSULE, LIQUID FILLED ORAL 2 TIMES DAILY
Qty: 60 CAPSULE | Refills: 0 | Status: SHIPPED | OUTPATIENT
Start: 2025-02-02 | End: 2025-03-04

## 2025-02-02 RX ORDER — POLYETHYLENE GLYCOL 3350 17 G/17G
17 POWDER, FOR SOLUTION ORAL DAILY
Qty: 1530 G | Refills: 0 | Status: SHIPPED | OUTPATIENT
Start: 2025-02-02 | End: 2025-05-03

## 2025-02-02 RX ADMIN — Medication 240 ML: at 00:24

## 2025-02-02 NOTE — ED PROVIDER NOTES
EMERGENCY DEPARTMENT ENCOUNTER      Pt Name: Jane Rogers  MRN: [id number]  Birthdate 1950  Date of evaluation: 2/1/2025  Provider: Jamar Emery PA-C    CHIEF COMPLAINT       Chief Complaint   Patient presents with    Abdominal Pain     History of IBS, diarrhea, since last night          HISTORY OF PRESENT ILLNESS      Jane Rogers is a 74 y.o. female who presents to the emergency department complaining of rectal pain that started getting worse over the past couple days.  Patient states that she has been having softer stools and diarrhea for the past couple days but now has noticed that she has rectal pain and pain while she sits.  She denies any abdominal pain but states it feels like pressure down the lower in her abdomen and rectal area.  Denies any fevers or chills, denies any nausea or vomiting        REVIEW OF SYSTEMS       Review of Systems   AS STATED IN HPI      PAST MEDICAL HISTORY     Past Medical History:   Diagnosis Date    Asthma     COPD (chronic obstructive pulmonary disease) (HCC)     Dyspnea     Fibromyalgia     Herniated disc, cervical     c 6-7    Herniated lumbar intervertebral disc     L3-4-4    Kidney disease          SURGICAL HISTORY       Past Surgical History:   Procedure Laterality Date    DENTAL SURGERY      HOLTER MONITOR 48 HOUR  10/11/2021    HYSTERECTOMY (CERVIX STATUS UNKNOWN)           CURRENT MEDICATIONS       Previous Medications    ALBUTEROL (PROVENTIL) (2.5 MG/3ML) 0.083% NEBULIZER SOLUTION    Take 3 mLs by nebulization every 6 hours as needed for Wheezing    ALBUTEROL SULFATE HFA (VENTOLIN HFA) 108 (90 BASE) MCG/ACT INHALER    Inhale 2 puffs into the lungs every 4 hours as needed for Wheezing    BUDESONIDE-FORMOTEROL (SYMBICORT) 160-4.5 MCG/ACT AERO    Inhale 2 puffs into the lungs 2 times daily    BUTALBITAL-ACETAMINOPHEN-CAFFEINE (FIORICET, ESGIC) -40 MG PER TABLET    Take 1 tablet by mouth every 4 hours as needed for Headaches

## 2025-02-02 NOTE — ED NOTES
Pt had large bowel movement, states she still feels pain in rectal area. Jamar ZALDIVAR notified.

## 2025-02-02 NOTE — ED PROVIDER NOTES
[facility] Emergency Department  [address]  [facility] [address]  Phone: [phone]  Fax: [phone]      Attending Physician Attestation    I performed a history and physical examination of the patient and discussed management with the mid level provider. I reviewed the mid level provider's note and agree with the documented findings and plan of care. Any areas of disagreement are noted on the chart. I was personally present for the key portions of any procedures. I have documented in the chart those procedures where I was not present during the key portions. I have reviewed the emergency nurses triage note. I agree with the chief complaint, past medical history, past surgical history, allergies, medications, social and family history as documented unless otherwise noted below. Documentation of the HPI, Physical Exam and Medical Decision Making performed by mid level providers is based on my personal performance of the HPI, PE and MDM. For Physician Assistant/ Nurse Practitioner cases/documentation I have personally evaluated this patient and have completed at least one if not all key elements of the E/M (history, physical exam, and MDM). Additional findings are as noted.      Chief Complaint   Patient presents with    Abdominal Pain     History of IBS, diarrhea, since last night        INITIAL VITALS:  height is 1.651 m (5' 5\") and weight is 42.2 kg (93 lb). Her oral temperature is 98.4 °F (36.9 °C). Her blood pressure is 125/89 and her pulse is 105 (abnormal). Her respiration is 16 and oxygen saturation is 93%.       DIAGNOSTIC RESULTS       RADIOLOGY:   Non-plain film images such as CT, Ultrasound and MRI are read by the radiologist. Plain radiographic images are visualized and the radiologist interpretations are reviewed as follows:     CT ABDOMEN PELVIS W IV CONTRAST Additional Contrast? None   Final Result   1. Moderately increased stool in the rectum with mild perirectal

## 2025-03-03 ENCOUNTER — OFFICE VISIT (OUTPATIENT)
Dept: GASTROENTEROLOGY | Age: 75
End: 2025-03-03
Payer: MEDICARE

## 2025-03-03 ENCOUNTER — PREP FOR PROCEDURE (OUTPATIENT)
Dept: GASTROENTEROLOGY | Age: 75
End: 2025-03-03

## 2025-03-03 VITALS
DIASTOLIC BLOOD PRESSURE: 98 MMHG | RESPIRATION RATE: 20 BRPM | OXYGEN SATURATION: 95 % | TEMPERATURE: 97.5 F | BODY MASS INDEX: 15.78 KG/M2 | SYSTOLIC BLOOD PRESSURE: 136 MMHG | WEIGHT: 94.8 LBS | HEART RATE: 98 BPM

## 2025-03-03 DIAGNOSIS — R63.4 WEIGHT LOSS: ICD-10-CM

## 2025-03-03 DIAGNOSIS — R10.84 ABDOMINAL PAIN, GENERALIZED: Primary | ICD-10-CM

## 2025-03-03 DIAGNOSIS — R10.84 ABDOMINAL PAIN, GENERALIZED: ICD-10-CM

## 2025-03-03 DIAGNOSIS — K76.9 LESION OF LIVER: ICD-10-CM

## 2025-03-03 DIAGNOSIS — K62.5 HEMORRHAGE OF RECTUM AND ANUS: ICD-10-CM

## 2025-03-03 DIAGNOSIS — R14.0 BLOATING: ICD-10-CM

## 2025-03-03 DIAGNOSIS — R93.3 ABNORMAL CT SCAN, COLON: ICD-10-CM

## 2025-03-03 PROCEDURE — 1125F AMNT PAIN NOTED PAIN PRSNT: CPT | Performed by: INTERNAL MEDICINE

## 2025-03-03 PROCEDURE — 1124F ACP DISCUSS-NO DSCNMKR DOCD: CPT | Performed by: INTERNAL MEDICINE

## 2025-03-03 PROCEDURE — 99204 OFFICE O/P NEW MOD 45 MIN: CPT | Performed by: INTERNAL MEDICINE

## 2025-03-03 PROCEDURE — G8419 CALC BMI OUT NRM PARAM NOF/U: HCPCS | Performed by: INTERNAL MEDICINE

## 2025-03-03 PROCEDURE — G8400 PT W/DXA NO RESULTS DOC: HCPCS | Performed by: INTERNAL MEDICINE

## 2025-03-03 PROCEDURE — 1036F TOBACCO NON-USER: CPT | Performed by: INTERNAL MEDICINE

## 2025-03-03 PROCEDURE — 3075F SYST BP GE 130 - 139MM HG: CPT | Performed by: INTERNAL MEDICINE

## 2025-03-03 PROCEDURE — 1090F PRES/ABSN URINE INCON ASSESS: CPT | Performed by: INTERNAL MEDICINE

## 2025-03-03 PROCEDURE — 3080F DIAST BP >= 90 MM HG: CPT | Performed by: INTERNAL MEDICINE

## 2025-03-03 PROCEDURE — 3017F COLORECTAL CA SCREEN DOC REV: CPT | Performed by: INTERNAL MEDICINE

## 2025-03-03 PROCEDURE — G8428 CUR MEDS NOT DOCUMENT: HCPCS | Performed by: INTERNAL MEDICINE

## 2025-03-03 ASSESSMENT — ENCOUNTER SYMPTOMS
RECTAL PAIN: 0
VOICE CHANGE: 0
BLOOD IN STOOL: 1
WHEEZING: 0
TROUBLE SWALLOWING: 0
CHOKING: 0
NAUSEA: 1
ANAL BLEEDING: 0
DIARRHEA: 0
ABDOMINAL PAIN: 1
SORE THROAT: 0
CONSTIPATION: 1
VOMITING: 0
COUGH: 0
ABDOMINAL DISTENTION: 1

## 2025-03-03 NOTE — PROGRESS NOTES
Reason for Referral:   No referring provider defined for this encounter.    Chief Complaint   Patient presents with    New Patient     Referred from ED due to blockage. Patients states that she was seen in ED for severe abdominal pain, bloating, blood in stool, constipation and nausea.            HISTORY OF PRESENT ILLNESS: Ms.Shirley SHAWNA Rogers is a 74 y.o. female , referred for evaluation of  Bloating   Abd pain   Constipation  Rectal bleeding  Nausea     Here for the first time   Referred from ER   Lower abd pain , across   Rectal bleeding and constipation   Lost 14 lb in the 6 months   Labs : normal LFT/cbc/ lipase   MRI : stercoral colitis     Liver lesions   As below         IMPRESSION:  1. Moderately increased stool in the rectum with mild perirectal stranding  suggesting fecal impaction and stercoral proctitis. Mildly increased stool  throughout the remainder of the colon.  2. Scattered indeterminate low-density lesions throughout the liver the  largest of which measures 8 mm in the left hepatic lobe. Consider further  evaluation with a nonemergent liver protocol MRI.  3. Small nonobstructing calculi in the left kidney. No obstructive uropathy  identified.  4. COPD.              Exam Ended: 02/01/25 22:20 EST Last Resulted: 02/01/25 22:52 EST       Order Details        View Encounter        Lab and Collection Details        Routing        Result History     View All Conversations on this Encounter           Result Care Coordination      Patient Communication     Add Comments   Seen Back to Top            Past Medical,Family, and Social History reviewed and does contribute to the patient presentingcondition.        I did review all the labs results available for the labs which were ordered by the primary care physician, and the other consultants, we search on [employer] at [facility] and all the available care everywhere epic    I did review all the imaging studies of the abdomen available on EMR, ordered by the

## 2025-03-04 RX ORDER — POLYETHYLENE GLYCOL 3350 17 G/17G
POWDER, FOR SOLUTION ORAL
Qty: 238 G | Refills: 0 | Status: SHIPPED | OUTPATIENT
Start: 2025-03-04

## 2025-03-04 RX ORDER — BISACODYL 5 MG/1
TABLET, DELAYED RELEASE ORAL
Qty: 4 TABLET | Refills: 0 | Status: SHIPPED | OUTPATIENT
Start: 2025-03-04

## 2025-03-04 NOTE — TELEPHONE ENCOUNTER
Procedure scheduled/Dr Shah  Procedure: colonoscopy  Dx: Abdominal pain, generalized ; Lesion of liver ; Weight loss    Date:6/16/25    Time:8:15 am arrival time 6:45 am  [facility] phone call:KAREN  Bowel Prep instructions given:Miralax   In office/via phone: in office  Clearance needed: none  GLP-1:  none

## 2025-03-14 ENCOUNTER — HOSPITAL ENCOUNTER (OUTPATIENT)
Age: 75
Discharge: HOME | End: 2025-03-14
Payer: MEDICARE

## 2025-03-14 ENCOUNTER — HOSPITAL ENCOUNTER (OUTPATIENT)
Dept: MRI IMAGING | Age: 75
Discharge: HOME | End: 2025-03-16
Attending: INTERNAL MEDICINE
Payer: MEDICARE

## 2025-03-14 DIAGNOSIS — K76.9 LESION OF LIVER: ICD-10-CM

## 2025-03-14 DIAGNOSIS — R63.4 WEIGHT LOSS: ICD-10-CM

## 2025-03-14 DIAGNOSIS — R10.84 ABDOMINAL PAIN, GENERALIZED: ICD-10-CM

## 2025-03-14 LAB
BUN SERPL-MCNC: 29 MG/DL (ref 8–23)
CANCER AG19-9 SERPL IA-ACNC: 14 U/ML (ref 0–35)
CEA SERPL-MCNC: 6.6 NG/ML (ref 0–3.8)
CREAT SERPL-MCNC: 1 MG/DL (ref 0.5–0.9)
GFR, ESTIMATED: 59 ML/MIN/1.73M2

## 2025-03-14 PROCEDURE — 2580000003 HC RX 258: Performed by: INTERNAL MEDICINE

## 2025-03-14 PROCEDURE — 86301 IMMUNOASSAY TUMOR CA 19-9: CPT

## 2025-03-14 PROCEDURE — 74183 MRI ABD W/O CNTR FLWD CNTR: CPT

## 2025-03-14 PROCEDURE — 2500000003 HC RX 250 WO HCPCS: Performed by: INTERNAL MEDICINE

## 2025-03-14 PROCEDURE — A9579 GAD-BASE MR CONTRAST NOS,1ML: HCPCS | Performed by: INTERNAL MEDICINE

## 2025-03-14 PROCEDURE — 84520 ASSAY OF UREA NITROGEN: CPT

## 2025-03-14 PROCEDURE — 82565 ASSAY OF CREATININE: CPT

## 2025-03-14 PROCEDURE — 82378 CARCINOEMBRYONIC ANTIGEN: CPT

## 2025-03-14 PROCEDURE — 36415 COLL VENOUS BLD VENIPUNCTURE: CPT

## 2025-03-14 PROCEDURE — 6360000004 HC RX CONTRAST MEDICATION: Performed by: INTERNAL MEDICINE

## 2025-03-14 RX ORDER — SODIUM CHLORIDE 0.9 % (FLUSH) 0.9 %
10 SYRINGE (ML) INJECTION PRN
Status: DISCONTINUED | OUTPATIENT
Start: 2025-03-14 | End: 2025-03-17 | Stop reason: HOSPADM

## 2025-03-14 RX ORDER — 0.9 % SODIUM CHLORIDE 0.9 %
50 INTRAVENOUS SOLUTION INTRAVENOUS ONCE
Status: COMPLETED | OUTPATIENT
Start: 2025-03-14 | End: 2025-03-14

## 2025-03-14 RX ADMIN — SODIUM CHLORIDE 50 ML: 9 INJECTION, SOLUTION INTRAVENOUS at 08:19

## 2025-03-14 RX ADMIN — SODIUM CHLORIDE, PRESERVATIVE FREE 10 ML: 5 INJECTION INTRAVENOUS at 08:19

## 2025-03-14 RX ADMIN — GADOTERIDOL 8 ML: 279.3 INJECTION, SOLUTION INTRAVENOUS at 08:18

## 2025-03-18 ENCOUNTER — RESULTS FOLLOW-UP (OUTPATIENT)
Dept: MRI IMAGING | Age: 75
End: 2025-03-18

## 2025-03-21 NOTE — TELEPHONE ENCOUNTER
Left message advising patient of results and follow up imaging in 2 years. Advised patient to call office with questions or concerns.

## 2025-03-21 NOTE — TELEPHONE ENCOUNTER
----- Message from Malgorzata Powell PA-C sent at 3/18/2025  4:28 PM EDT -----  Stable liver cysts. Cysts in pancreatic tail. Will need to repeat imaging in 2 yrs

## 2025-03-26 ENCOUNTER — RESULTS FOLLOW-UP (OUTPATIENT)
Dept: GASTROENTEROLOGY | Age: 75
End: 2025-03-26

## 2025-03-31 ENCOUNTER — ANESTHESIA EVENT (OUTPATIENT)
Dept: OPERATING ROOM | Age: 75
End: 2025-03-31
Payer: MEDICARE

## 2025-03-31 NOTE — PRE-PROCEDURE INSTRUCTIONS
VM left with pre-colonoscopy instructions:     Date/time/location of procedure     NPO after MN status     Need for       Need to complete bowel prep/instructions per Dr. Pablo ADAM phone number [phone] provided for pt to call with any further questions prior to procedure.

## 2025-04-01 ENCOUNTER — ANESTHESIA (OUTPATIENT)
Dept: OPERATING ROOM | Age: 75
End: 2025-04-01
Payer: MEDICARE

## 2025-04-01 ENCOUNTER — HOSPITAL ENCOUNTER (OUTPATIENT)
Age: 75
Setting detail: OUTPATIENT SURGERY
Discharge: HOME | End: 2025-04-01
Attending: INTERNAL MEDICINE | Admitting: INTERNAL MEDICINE
Payer: MEDICARE

## 2025-04-01 VITALS
TEMPERATURE: 98.2 F | SYSTOLIC BLOOD PRESSURE: 127 MMHG | HEIGHT: 65 IN | BODY MASS INDEX: 14.99 KG/M2 | RESPIRATION RATE: 18 BRPM | OXYGEN SATURATION: 96 % | HEART RATE: 90 BPM | DIASTOLIC BLOOD PRESSURE: 79 MMHG | WEIGHT: 90 LBS

## 2025-04-01 PROCEDURE — 3700000000 HC ANESTHESIA ATTENDED CARE: Performed by: INTERNAL MEDICINE

## 2025-04-01 PROCEDURE — 6370000000 HC RX 637 (ALT 250 FOR IP)

## 2025-04-01 PROCEDURE — 6360000002 HC RX W HCPCS: Performed by: NURSE ANESTHETIST, CERTIFIED REGISTERED

## 2025-04-01 PROCEDURE — 2709999900 HC NON-CHARGEABLE SUPPLY: Performed by: INTERNAL MEDICINE

## 2025-04-01 PROCEDURE — 3609027000 HC COLONOSCOPY: Performed by: INTERNAL MEDICINE

## 2025-04-01 PROCEDURE — 3700000001 HC ADD 15 MINUTES (ANESTHESIA): Performed by: INTERNAL MEDICINE

## 2025-04-01 PROCEDURE — 45378 DIAGNOSTIC COLONOSCOPY: CPT | Performed by: INTERNAL MEDICINE

## 2025-04-01 PROCEDURE — 7100000010 HC PHASE II RECOVERY - FIRST 15 MIN: Performed by: INTERNAL MEDICINE

## 2025-04-01 PROCEDURE — 7100000011 HC PHASE II RECOVERY - ADDTL 15 MIN: Performed by: INTERNAL MEDICINE

## 2025-04-01 RX ORDER — LIDOCAINE HYDROCHLORIDE 10 MG/ML
INJECTION, SOLUTION EPIDURAL; INFILTRATION; INTRACAUDAL; PERINEURAL
Status: DISCONTINUED | OUTPATIENT
Start: 2025-04-01 | End: 2025-04-01 | Stop reason: SDUPTHER

## 2025-04-01 RX ORDER — LABETALOL HYDROCHLORIDE 5 MG/ML
10 INJECTION, SOLUTION INTRAVENOUS
Status: CANCELLED | OUTPATIENT
Start: 2025-04-01

## 2025-04-01 RX ORDER — LIDOCAINE HYDROCHLORIDE 10 MG/ML
1 INJECTION, SOLUTION EPIDURAL; INFILTRATION; INTRACAUDAL; PERINEURAL
Status: DISCONTINUED | OUTPATIENT
Start: 2025-04-01 | End: 2025-04-01 | Stop reason: HOSPADM

## 2025-04-01 RX ORDER — SODIUM CHLORIDE 9 MG/ML
INJECTION, SOLUTION INTRAVENOUS PRN
Status: CANCELLED | OUTPATIENT
Start: 2025-04-01

## 2025-04-01 RX ORDER — SODIUM CHLORIDE 0.9 % (FLUSH) 0.9 %
5-40 SYRINGE (ML) INJECTION PRN
Status: CANCELLED | OUTPATIENT
Start: 2025-04-01

## 2025-04-01 RX ORDER — SODIUM CHLORIDE 9 MG/ML
INJECTION, SOLUTION INTRAVENOUS PRN
Status: DISCONTINUED | OUTPATIENT
Start: 2025-04-01 | End: 2025-04-01 | Stop reason: HOSPADM

## 2025-04-01 RX ORDER — NALOXONE HYDROCHLORIDE 0.4 MG/ML
INJECTION, SOLUTION INTRAMUSCULAR; INTRAVENOUS; SUBCUTANEOUS PRN
Status: CANCELLED | OUTPATIENT
Start: 2025-04-01

## 2025-04-01 RX ORDER — SODIUM CHLORIDE 0.9 % (FLUSH) 0.9 %
5-40 SYRINGE (ML) INJECTION EVERY 12 HOURS SCHEDULED
Status: DISCONTINUED | OUTPATIENT
Start: 2025-04-01 | End: 2025-04-01 | Stop reason: HOSPADM

## 2025-04-01 RX ORDER — SODIUM CHLORIDE 0.9 % (FLUSH) 0.9 %
5-40 SYRINGE (ML) INJECTION EVERY 12 HOURS SCHEDULED
Status: CANCELLED | OUTPATIENT
Start: 2025-04-01

## 2025-04-01 RX ORDER — IPRATROPIUM BROMIDE AND ALBUTEROL SULFATE 2.5; .5 MG/3ML; MG/3ML
SOLUTION RESPIRATORY (INHALATION)
Status: COMPLETED
Start: 2025-04-01 | End: 2025-04-01

## 2025-04-01 RX ORDER — SODIUM CHLORIDE, SODIUM LACTATE, POTASSIUM CHLORIDE, CALCIUM CHLORIDE 600; 310; 30; 20 MG/100ML; MG/100ML; MG/100ML; MG/100ML
INJECTION, SOLUTION INTRAVENOUS CONTINUOUS
Status: DISCONTINUED | OUTPATIENT
Start: 2025-04-01 | End: 2025-04-01 | Stop reason: HOSPADM

## 2025-04-01 RX ORDER — PROCHLORPERAZINE EDISYLATE 5 MG/ML
5 INJECTION INTRAMUSCULAR; INTRAVENOUS
Status: CANCELLED | OUTPATIENT
Start: 2025-04-01

## 2025-04-01 RX ORDER — HYDRALAZINE HYDROCHLORIDE 20 MG/ML
10 INJECTION INTRAMUSCULAR; INTRAVENOUS
Status: CANCELLED | OUTPATIENT
Start: 2025-04-01

## 2025-04-01 RX ORDER — SODIUM CHLORIDE 0.9 % (FLUSH) 0.9 %
5-40 SYRINGE (ML) INJECTION PRN
Status: DISCONTINUED | OUTPATIENT
Start: 2025-04-01 | End: 2025-04-01 | Stop reason: HOSPADM

## 2025-04-01 RX ORDER — PROPOFOL 10 MG/ML
INJECTION, EMULSION INTRAVENOUS
Status: DISCONTINUED | OUTPATIENT
Start: 2025-04-01 | End: 2025-04-01 | Stop reason: SDUPTHER

## 2025-04-01 RX ORDER — IPRATROPIUM BROMIDE AND ALBUTEROL SULFATE 2.5; .5 MG/3ML; MG/3ML
1 SOLUTION RESPIRATORY (INHALATION) ONCE
Status: COMPLETED | OUTPATIENT
Start: 2025-04-01 | End: 2025-04-01

## 2025-04-01 RX ADMIN — LIDOCAINE HYDROCHLORIDE 50 MG: 10 INJECTION, SOLUTION EPIDURAL; INFILTRATION; INTRACAUDAL; PERINEURAL at 08:04

## 2025-04-01 RX ADMIN — IPRATROPIUM BROMIDE AND ALBUTEROL SULFATE 1 DOSE: 2.5; .5 SOLUTION RESPIRATORY (INHALATION) at 06:46

## 2025-04-01 RX ADMIN — IPRATROPIUM BROMIDE AND ALBUTEROL SULFATE 1 DOSE: .5; 2.5 SOLUTION RESPIRATORY (INHALATION) at 06:46

## 2025-04-01 RX ADMIN — PROPOFOL 200 MG: 10 INJECTION, EMULSION INTRAVENOUS at 08:04

## 2025-04-01 ASSESSMENT — PAIN - FUNCTIONAL ASSESSMENT: PAIN_FUNCTIONAL_ASSESSMENT: 0-10

## 2025-04-01 ASSESSMENT — COPD QUESTIONNAIRES: CAT_SEVERITY: MODERATE

## 2025-04-01 ASSESSMENT — ENCOUNTER SYMPTOMS: SHORTNESS OF BREATH: 1

## 2025-04-01 ASSESSMENT — PAIN DESCRIPTION - DESCRIPTORS: DESCRIPTORS: ACHING

## 2025-04-01 NOTE — OP NOTE
normal    Transverse colon: normal    Descending/Sigmoid colon: normal    Rectum/Anus: examined in normal and retroflexed positions and was abnormal: Prominent hemorrhoids    Withdrawal Time was (minutes): 9    The colon was decompressed and the scope was removed.  The patient tolerated the procedure well.     Recommendations/Plan:   Lifestyle and dietary modifications as discussed  F/U Biopsies  F/U In OfficeYes  Discussed with the family  Repeat colonoscopy [id number]    Electronically signed by Susannah Shah MD  on 4/1/2025 at 8:32 AM

## 2025-04-01 NOTE — ANESTHESIA PRE PROCEDURE
Department of Anesthesiology  Preprocedure Note       Name:  Jane Rogers   Age:  74 y.o.  :  1950                                          MRN:  [id number]         Date:  2025      Surgeon: Surgeon(s):  Susannah Shah MD    Procedure: Procedure(s):  COLONOSCOPY DIAGNOSTIC    Medications prior to admission:   Prior to Admission medications    Medication Sig Start Date End Date Taking? Authorizing Provider   bisacodyl 5 MG EC tablet Take as directed by physician office for bowel prep 3/4/25  Yes Susannah Shah MD   polyethylene glycol (GLYCOLAX) 17 GM/SCOOP powder Take as directed by physician office for bowel prep 3/4/25  Yes Susannah Shah MD   polyethylene glycol (GLYCOLAX) 17 GM/SCOOP powder Take 17 g by mouth daily 2/2/25 5/3/25  Jamar Emery PA-C   albuterol (PROVENTIL) (2.5 MG/3ML) 0.083% nebulizer solution Take 3 mLs by nebulization every 6 hours as needed for Wheezing 10/9/22   Amado Jones,    albuterol sulfate HFA (VENTOLIN HFA) 108 (90 Base) MCG/ACT inhaler Inhale 2 puffs into the lungs every 4 hours as needed for Wheezing 10/9/22   Amado Jones,    dextromethorphan-guaiFENesin (ROBITUSSIN-DM)  MG/5ML syrup Take 10 mLs by mouth every 4 hours as needed for Cough 10/9/22   Amado Jones,    ipratropium-albuterol (DUONEB) 0.5-2.5 (3) MG/3ML SOLN nebulizer solution Inhale 3 mLs into the lungs 4 times daily 10/9/22   Amado Jones DO       Current medications:    Current Facility-Administered Medications   Medication Dose Route Frequency Provider Last Rate Last Admin    lidocaine PF 1 % injection 1 mL  1 mL IntraDERmal Once PRN Terrell Miller MD        lactated ringers infusion   IntraVENous Continuous Terrell Miller MD        sodium chloride flush 0.9 % injection 5-40 mL  5-40 mL IntraVENous 2 times per day Terrell Miller MD        sodium chloride flush 0.9 % injection 5-40 mL  5-40 mL IntraVENous PRN Terrell Miller MD        0.9 % sodium chloride

## 2025-04-01 NOTE — DISCHARGE INSTRUCTIONS

## 2025-04-01 NOTE — H&P
Procedure History and Physical    Pre-Procedural Diagnosis:    Abd pain    Wt loss  Rectal bleeding    Indications:  same    Procedure Planned: colonoscopy     History Obtained From:  patient    HISTORY OF PRESENT ILLNESS:       The patient is a 74 y.o. female who presents for the above procedure.        Past Medical History:    Past Medical History:   Diagnosis Date    Asthma     COPD (chronic obstructive pulmonary disease) (HCC)     Dyspnea     Fibromyalgia     Herniated disc, cervical     c 6-7    Herniated lumbar intervertebral disc     L3-4-4    Kidney disease        Past Surgical History:    Past Surgical History:   Procedure Laterality Date    DENTAL SURGERY      HOLTER MONITOR 48 HOUR  10/11/2021    HYSTERECTOMY (CERVIX STATUS UNKNOWN)         Medications:  Current Facility-Administered Medications   Medication Dose Route Frequency Provider Last Rate Last Admin    lidocaine PF 1 % injection 1 mL  1 mL IntraDERmal Once PRN Terrell Miller MD        lactated ringers infusion   IntraVENous Continuous Terrell Miller MD        sodium chloride flush 0.9 % injection 5-40 mL  5-40 mL IntraVENous 2 times per day Terrell Miller MD        sodium chloride flush 0.9 % injection 5-40 mL  5-40 mL IntraVENous PRN Terrell Miller MD        0.9 % sodium chloride infusion   IntraVENous PRN Terrell Miller MD           Allergies:   Allergies   Allergen Reactions    Oxycodone-Acetaminophen      Other reaction(s): Unknown    Penicillin G Sodium Rash    Penicillins Rash                 Social   Social History     Tobacco Use    Smoking status: Former     Current packs/day: 0.00     Average packs/day: 1 pack/day for 30.0 years (30.0 ttl pk-yrs)     Types: Cigarettes     Start date:      Quit date:      Years since quittin.2    Smokeless tobacco: Never   Substance Use Topics    Alcohol use: Yes     Alcohol/week: 3.0 standard drinks of alcohol     Types: 3 Glasses of wine per week        PSYCH HISTORY:  Depression

## 2025-04-01 NOTE — ANESTHESIA POSTPROCEDURE EVALUATION
Department of Anesthesiology  Postprocedure Note    Patient: Jane Rogers  MRN: [id number]  YOB: 1950  Date of evaluation: 4/1/2025    Procedure Summary       Date: 04/01/25 Room / Location: [facility] PROCEDURE ROOM / [facility]    Anesthesia Start: 0800 Anesthesia Stop: 0831    Procedure: COLONOSCOPY DIAGNOSTIC Diagnosis:       Abdominal pain, generalized      Lesion of liver      Weight loss      (Abdominal pain, generalized [R10.84])      (Lesion of liver [K76.9])      (Weight loss [R63.4])    Surgeons: Susannah Shah MD Responsible Provider: Terrell Miller MD    Anesthesia Type: TIVA ASA Status: 3            Anesthesia Type: No value filed.    Wilfrid Phase I: Wilfrid Score: 9    Wilfrid Phase II: Wilfrid Score: 10    Anesthesia Post Evaluation    Patient location during evaluation: PACU  Patient participation: complete - patient participated  Level of consciousness: awake and awake and alert  Pain score: 0  Nausea & Vomiting: no nausea and no vomiting  Cardiovascular status: hemodynamically stable and blood pressure returned to baseline  Respiratory status: acceptable  Hydration status: euvolemic  Multimodal analgesia pain management approach  Pain management: adequate    No notable events documented.

## (undated) DEVICE — PERRYSBURG ENDO PACK: Brand: MEDLINE INDUSTRIES, INC.

## (undated) DEVICE — 4-PORT MANIFOLD: Brand: NEPTUNE 2

## (undated) DEVICE — ADAPTER CLEANING PORPOISE CLEANING

## (undated) DEVICE — SOLUTION IRRIG 1000ML STRL H2O USP PLAS POUR BTL

## (undated) DEVICE — Device: Brand: DEFENDO VALVE AND CONNECTOR KIT

## (undated) DEVICE — GLOVE ORANGE PI 7 1/2   MSG9075